# Patient Record
Sex: MALE | Race: OTHER | HISPANIC OR LATINO | Employment: FULL TIME | URBAN - METROPOLITAN AREA
[De-identification: names, ages, dates, MRNs, and addresses within clinical notes are randomized per-mention and may not be internally consistent; named-entity substitution may affect disease eponyms.]

---

## 2017-02-13 ENCOUNTER — ANESTHESIA EVENT (OUTPATIENT)
Dept: GASTROENTEROLOGY | Facility: HOSPITAL | Age: 57
End: 2017-02-13
Payer: COMMERCIAL

## 2017-02-13 ENCOUNTER — GENERIC CONVERSION - ENCOUNTER (OUTPATIENT)
Dept: OTHER | Facility: OTHER | Age: 57
End: 2017-02-13

## 2017-02-13 ENCOUNTER — HOSPITAL ENCOUNTER (OUTPATIENT)
Facility: HOSPITAL | Age: 57
Setting detail: OUTPATIENT SURGERY
Discharge: HOME | End: 2017-02-13
Attending: INTERNAL MEDICINE | Admitting: INTERNAL MEDICINE
Payer: COMMERCIAL

## 2017-02-13 ENCOUNTER — ANESTHESIA (OUTPATIENT)
Dept: GASTROENTEROLOGY | Facility: HOSPITAL | Age: 57
End: 2017-02-13
Payer: COMMERCIAL

## 2017-02-13 VITALS
TEMPERATURE: 97.1 F | HEIGHT: 66 IN | OXYGEN SATURATION: 96 % | SYSTOLIC BLOOD PRESSURE: 123 MMHG | DIASTOLIC BLOOD PRESSURE: 86 MMHG | WEIGHT: 193.5 LBS | RESPIRATION RATE: 16 BRPM | BODY MASS INDEX: 31.1 KG/M2 | HEART RATE: 75 BPM

## 2017-02-13 DIAGNOSIS — K63.5 POLYP OF COLON: ICD-10-CM

## 2017-02-13 PROCEDURE — 88342 IMHCHEM/IMCYTCHM 1ST ANTB: CPT | Performed by: INTERNAL MEDICINE

## 2017-02-13 PROCEDURE — 88305 TISSUE EXAM BY PATHOLOGIST: CPT | Performed by: INTERNAL MEDICINE

## 2017-02-13 RX ORDER — PANTOPRAZOLE SODIUM 40 MG/1
40 TABLET, DELAYED RELEASE ORAL DAILY
COMMUNITY
End: 2018-06-03

## 2017-02-13 RX ORDER — PROPOFOL 10 MG/ML
INJECTION, EMULSION INTRAVENOUS CONTINUOUS PRN
Status: DISCONTINUED | OUTPATIENT
Start: 2017-02-13 | End: 2017-02-13 | Stop reason: SURG

## 2017-02-13 RX ORDER — LIDOCAINE HYDROCHLORIDE 10 MG/ML
INJECTION, SOLUTION INFILTRATION; PERINEURAL AS NEEDED
Status: DISCONTINUED | OUTPATIENT
Start: 2017-02-13 | End: 2017-02-13 | Stop reason: SURG

## 2017-02-13 RX ORDER — SODIUM CHLORIDE, SODIUM LACTATE, POTASSIUM CHLORIDE, CALCIUM CHLORIDE 600; 310; 30; 20 MG/100ML; MG/100ML; MG/100ML; MG/100ML
125 INJECTION, SOLUTION INTRAVENOUS CONTINUOUS
Status: DISCONTINUED | OUTPATIENT
Start: 2017-02-13 | End: 2017-02-13 | Stop reason: HOSPADM

## 2017-02-13 RX ORDER — ROSUVASTATIN CALCIUM 10 MG/1
10 TABLET, COATED ORAL DAILY
COMMUNITY

## 2017-02-13 RX ORDER — PROPOFOL 10 MG/ML
INJECTION, EMULSION INTRAVENOUS AS NEEDED
Status: DISCONTINUED | OUTPATIENT
Start: 2017-02-13 | End: 2017-02-13 | Stop reason: SURG

## 2017-02-13 RX ORDER — DIPHENOXYLATE HYDROCHLORIDE AND ATROPINE SULFATE 2.5; .025 MG/1; MG/1
1 TABLET ORAL DAILY
COMMUNITY

## 2017-02-13 RX ORDER — SODIUM CHLORIDE, SODIUM LACTATE, POTASSIUM CHLORIDE, CALCIUM CHLORIDE 600; 310; 30; 20 MG/100ML; MG/100ML; MG/100ML; MG/100ML
INJECTION, SOLUTION INTRAVENOUS CONTINUOUS PRN
Status: DISCONTINUED | OUTPATIENT
Start: 2017-02-13 | End: 2017-02-13 | Stop reason: SURG

## 2017-02-13 RX ADMIN — PROPOFOL 50 MG: 10 INJECTION, EMULSION INTRAVENOUS at 09:33

## 2017-02-13 RX ADMIN — LIDOCAINE HYDROCHLORIDE 30 MG: 10 INJECTION, SOLUTION INFILTRATION; PERINEURAL at 09:30

## 2017-02-13 RX ADMIN — PROPOFOL 100 MCG/KG/MIN: 10 INJECTION, EMULSION INTRAVENOUS at 09:30

## 2017-02-13 RX ADMIN — PROPOFOL 50 MG: 10 INJECTION, EMULSION INTRAVENOUS at 09:36

## 2017-02-13 RX ADMIN — PROPOFOL 100 MG: 10 INJECTION, EMULSION INTRAVENOUS at 09:30

## 2017-02-13 RX ADMIN — SODIUM CHLORIDE, SODIUM LACTATE, POTASSIUM CHLORIDE, AND CALCIUM CHLORIDE: .6; .31; .03; .02 INJECTION, SOLUTION INTRAVENOUS at 09:02

## 2017-02-19 ENCOUNTER — GENERIC CONVERSION - ENCOUNTER (OUTPATIENT)
Dept: OTHER | Facility: OTHER | Age: 57
End: 2017-02-19

## 2017-05-25 ENCOUNTER — ALLSCRIPTS OFFICE VISIT (OUTPATIENT)
Dept: OTHER | Facility: OTHER | Age: 57
End: 2017-05-25

## 2017-05-26 ENCOUNTER — TRANSCRIBE ORDERS (OUTPATIENT)
Dept: LAB | Facility: CLINIC | Age: 57
End: 2017-05-26

## 2017-05-26 ENCOUNTER — APPOINTMENT (OUTPATIENT)
Dept: LAB | Facility: CLINIC | Age: 57
End: 2017-05-26
Payer: COMMERCIAL

## 2017-05-26 DIAGNOSIS — A04.8 OTHER SPECIFIED BACTERIAL INTESTINAL INFECTIONS: ICD-10-CM

## 2017-05-26 PROCEDURE — 87338 HPYLORI STOOL AG IA: CPT

## 2017-05-27 LAB — H PYLORI AG STL QL IA: NEGATIVE

## 2017-05-30 ENCOUNTER — GENERIC CONVERSION - ENCOUNTER (OUTPATIENT)
Dept: OTHER | Facility: OTHER | Age: 57
End: 2017-05-30

## 2017-08-10 ENCOUNTER — TRANSCRIBE ORDERS (OUTPATIENT)
Dept: LAB | Facility: CLINIC | Age: 57
End: 2017-08-10

## 2017-08-10 ENCOUNTER — APPOINTMENT (OUTPATIENT)
Dept: LAB | Facility: CLINIC | Age: 57
End: 2017-08-10
Payer: COMMERCIAL

## 2017-08-10 DIAGNOSIS — E78.2 MULTIPLE-TYPE HYPERLIPIDEMIA: ICD-10-CM

## 2017-08-10 DIAGNOSIS — R53.83 FATIGUE, UNSPECIFIED TYPE: ICD-10-CM

## 2017-08-10 DIAGNOSIS — Z12.5 SPECIAL SCREENING FOR MALIGNANT NEOPLASM OF PROSTATE: ICD-10-CM

## 2017-08-10 DIAGNOSIS — R35.0 URINARY FREQUENCY: ICD-10-CM

## 2017-08-10 DIAGNOSIS — E78.2 MULTIPLE-TYPE HYPERLIPIDEMIA: Primary | ICD-10-CM

## 2017-08-10 DIAGNOSIS — Z79.899 LONG-TERM USE OF HIGH-RISK MEDICATION: ICD-10-CM

## 2017-08-10 LAB
ALBUMIN SERPL BCP-MCNC: 3.7 G/DL (ref 3.5–5)
ALP SERPL-CCNC: 87 U/L (ref 46–116)
ALT SERPL W P-5'-P-CCNC: 42 U/L (ref 12–78)
ANION GAP SERPL CALCULATED.3IONS-SCNC: 6 MMOL/L (ref 4–13)
AST SERPL W P-5'-P-CCNC: 26 U/L (ref 5–45)
BACTERIA UR QL AUTO: ABNORMAL /HPF
BASOPHILS # BLD AUTO: 0.06 THOUSANDS/ΜL (ref 0–0.1)
BASOPHILS NFR BLD AUTO: 1 % (ref 0–1)
BILIRUB SERPL-MCNC: 0.5 MG/DL (ref 0.2–1)
BILIRUB UR QL STRIP: NEGATIVE
BUN SERPL-MCNC: 8 MG/DL (ref 5–25)
CALCIUM SERPL-MCNC: 9 MG/DL (ref 8.3–10.1)
CHLORIDE SERPL-SCNC: 104 MMOL/L (ref 100–108)
CHOLEST SERPL-MCNC: 200 MG/DL (ref 50–200)
CLARITY UR: CLEAR
CO2 SERPL-SCNC: 30 MMOL/L (ref 21–32)
COLOR UR: YELLOW
CREAT SERPL-MCNC: 0.84 MG/DL (ref 0.6–1.3)
EOSINOPHIL # BLD AUTO: 0.07 THOUSAND/ΜL (ref 0–0.61)
EOSINOPHIL NFR BLD AUTO: 1 % (ref 0–6)
ERYTHROCYTE [DISTWIDTH] IN BLOOD BY AUTOMATED COUNT: 15.6 % (ref 11.6–15.1)
GFR SERPL CREATININE-BSD FRML MDRD: 98 ML/MIN/1.73SQ M
GLUCOSE P FAST SERPL-MCNC: 98 MG/DL (ref 65–99)
GLUCOSE UR STRIP-MCNC: NEGATIVE MG/DL
HCT VFR BLD AUTO: 48.4 % (ref 36.5–49.3)
HDLC SERPL-MCNC: 25 MG/DL (ref 40–60)
HGB BLD-MCNC: 16 G/DL (ref 12–17)
HGB UR QL STRIP.AUTO: NEGATIVE
KETONES UR STRIP-MCNC: NEGATIVE MG/DL
LDLC SERPL CALC-MCNC: 100 MG/DL (ref 0–100)
LEUKOCYTE ESTERASE UR QL STRIP: ABNORMAL
LYMPHOCYTES # BLD AUTO: 2.32 THOUSANDS/ΜL (ref 0.6–4.47)
LYMPHOCYTES NFR BLD AUTO: 43 % (ref 14–44)
MCH RBC QN AUTO: 27.1 PG (ref 26.8–34.3)
MCHC RBC AUTO-ENTMCNC: 33.1 G/DL (ref 31.4–37.4)
MCV RBC AUTO: 82 FL (ref 82–98)
MONOCYTES # BLD AUTO: 0.42 THOUSAND/ΜL (ref 0.17–1.22)
MONOCYTES NFR BLD AUTO: 8 % (ref 4–12)
NEUTROPHILS # BLD AUTO: 2.57 THOUSANDS/ΜL (ref 1.85–7.62)
NEUTS SEG NFR BLD AUTO: 47 % (ref 43–75)
NITRITE UR QL STRIP: NEGATIVE
NON-SQ EPI CELLS URNS QL MICRO: ABNORMAL /HPF
PH UR STRIP.AUTO: 7 [PH] (ref 4.5–8)
PLATELET # BLD AUTO: 192 THOUSANDS/UL (ref 149–390)
PMV BLD AUTO: 11.9 FL (ref 8.9–12.7)
POTASSIUM SERPL-SCNC: 3.9 MMOL/L (ref 3.5–5.3)
PROT SERPL-MCNC: 7.2 G/DL (ref 6.4–8.2)
PROT UR STRIP-MCNC: NEGATIVE MG/DL
PSA SERPL-MCNC: 0.8 NG/ML (ref 0–4)
RBC # BLD AUTO: 5.9 MILLION/UL (ref 3.88–5.62)
RBC #/AREA URNS AUTO: ABNORMAL /HPF
SODIUM SERPL-SCNC: 140 MMOL/L (ref 136–145)
SP GR UR STRIP.AUTO: 1.02 (ref 1–1.03)
T3 SERPL-MCNC: 1.1 NG/ML (ref 0.6–1.8)
T4 FREE SERPL-MCNC: 0.91 NG/DL (ref 0.76–1.46)
TRIGL SERPL-MCNC: 373 MG/DL
TSH SERPL DL<=0.05 MIU/L-ACNC: 1.64 UIU/ML (ref 0.36–3.74)
UROBILINOGEN UR QL STRIP.AUTO: 0.2 E.U./DL
WBC # BLD AUTO: 5.44 THOUSAND/UL (ref 4.31–10.16)
WBC #/AREA URNS AUTO: ABNORMAL /HPF

## 2017-08-10 PROCEDURE — 84439 ASSAY OF FREE THYROXINE: CPT

## 2017-08-10 PROCEDURE — G0103 PSA SCREENING: HCPCS

## 2017-08-10 PROCEDURE — 85025 COMPLETE CBC W/AUTO DIFF WBC: CPT

## 2017-08-10 PROCEDURE — 80053 COMPREHEN METABOLIC PANEL: CPT

## 2017-08-10 PROCEDURE — 84443 ASSAY THYROID STIM HORMONE: CPT

## 2017-08-10 PROCEDURE — 80061 LIPID PANEL: CPT

## 2017-08-10 PROCEDURE — 81001 URINALYSIS AUTO W/SCOPE: CPT | Performed by: PODIATRIST

## 2017-08-10 PROCEDURE — 84480 ASSAY TRIIODOTHYRONINE (T3): CPT

## 2017-08-10 PROCEDURE — 36415 COLL VENOUS BLD VENIPUNCTURE: CPT

## 2017-10-06 ENCOUNTER — APPOINTMENT (OUTPATIENT)
Dept: LAB | Facility: MEDICAL CENTER | Age: 57
End: 2017-10-06

## 2017-10-06 ENCOUNTER — TRANSCRIBE ORDERS (OUTPATIENT)
Dept: URGENT CARE | Facility: MEDICAL CENTER | Age: 57
End: 2017-10-06

## 2017-10-06 DIAGNOSIS — Z00.00 PHYSICAL EXAM: Primary | ICD-10-CM

## 2017-10-06 LAB — RUBV IGG SERPL IA-ACNC: 160.8 IU/ML

## 2017-10-06 PROCEDURE — 86765 RUBEOLA ANTIBODY: CPT

## 2017-10-06 PROCEDURE — 86787 VARICELLA-ZOSTER ANTIBODY: CPT

## 2017-10-06 PROCEDURE — 36415 COLL VENOUS BLD VENIPUNCTURE: CPT

## 2017-10-06 PROCEDURE — 86762 RUBELLA ANTIBODY: CPT

## 2017-10-06 PROCEDURE — 86480 TB TEST CELL IMMUN MEASURE: CPT

## 2017-10-06 PROCEDURE — 86735 MUMPS ANTIBODY: CPT

## 2017-10-08 LAB — QUANTIFERON-TB GOLD IN TUBE: NORMAL

## 2017-10-10 LAB
MEV IGG SER QL: NORMAL
MUV IGG SER QL: NORMAL
VZV IGG SER IA-ACNC: NORMAL

## 2018-01-14 NOTE — RESULT NOTES
Discussion/Summary   Please inform patient that the stool H  pylori antigen test is negative, meaning that his antibiotic treatment was successful in eradicating the infection  He may continue with Protonix if he remains with symptoms, but he does not need any further antibiotic treatment       Verified Results  (1) Alberto Phillips, STOOL [id number] 11:15AM Richard Cat Order Number: [id number]     Test Name Result Flag Reference   H PYLORI ANTIGEN Negative  Negative   Performed at:  [address]  [id number]  : Maira Rubio MD, Phone:  [phone]

## 2018-01-14 NOTE — MISCELLANEOUS
Message  GI Reminder Recall [facility]:   Date: 02/24/2016   Dear Jose F Santos:     Review of our records shows you are due for the following: Colonoscopy  Please call the following office to schedule your appointment:   [address], [address], [place], [address] [place] [address]  [phone]  We look forward to hearing from you! Sincerely,         Signatures   Electronically signed by :  Jesse Zepeda, ; Feb 24 2016  4:07PM EST                       (Author)

## 2018-01-15 VITALS
HEART RATE: 86 BPM | SYSTOLIC BLOOD PRESSURE: 112 MMHG | TEMPERATURE: 97.2 F | BODY MASS INDEX: 31.63 KG/M2 | OXYGEN SATURATION: 98 % | DIASTOLIC BLOOD PRESSURE: 78 MMHG | WEIGHT: 196 LBS

## 2018-01-18 NOTE — RESULT NOTES
Message    [place] to call patient      Gastric biopsies are positive for H  pylori  Patient was treated with triple therapy 3 years ago  Treatment with quadruple therapy now  Follow-up office visit and check H  pylori antigen in the stool     ADDENDUM: I spoke with patient about results; I sent Rx for quadruple therapy (bismuth, protonix, doxycycline and flagyl) and tasked office staff to set up OV in 2 months  Verified Results  (1) TISSUE EXAM [id number] 09:34AM Hector Robbie     Test Name Result Flag Reference   LAB AP CASE REPORT (Report)     Surgical Pathology Report             Case: [id number]                   Authorizing Provider: Cristopher Nevarez MD     Collected:      02/13/2017 0934        Ordering Location:   [facility]    Received[facility]      [facility]/2017 [address] [facility]                               Pathologist:      Nuria Pollock MD                                Specimen:  Stomach, Bx Gastric body, r/o H pylori   LAB AP FINAL DIAGNOSIS (Report)     A  Stomach, Bx Gastric body, r/o H pylori:  Chronic, moderately active gastritis   -No evidence of Paneth cell metaplasia   -No evidence of dysplasia or malignancy  -Many H Pylori organisms identified on immunohistochemical stained slide  Control reacted appropriatel    Electronically signed by Nuria Pollock MD on 2/14/2017 at 1:52 PM   LAB AP SURGICAL ADDITIONAL INFORMATION (Report)     These tests were developed and their performance characteristics   determined by [facility] or [employer]  They may not be cleared or approved by the [place]  Food and   Drug Administration  The FDA has determined that such clearance or   approval is not necessary  These tests are used for clinical purposes  They should not be regarded as investigational or for research   This   laboratory has been approved by CLIA 88, designated as a high-complexity   laboratory and is qualified to perform these tests    LAB AP GROSS DESCRIPTION (Report)     A  The specimen is received in formalin, labeled with the patient's name   and hospital number, and is designated gastric body biopsy  The   specimen consists of 2 tan-pink soft tissue fragments measuring 0 3 cm and   0 6 cm in greatest dimension  Entirely submitted  One cassette  Note: The estimated total formalin fixation time based upon information   provided by the submitting clinician and the standard processing schedule   is 19 0 hours    MAS   LAB AP CLINICAL INFORMATION R/o h pylori     R/o h pylori

## 2018-01-29 ENCOUNTER — LAB (OUTPATIENT)
Dept: LAB | Facility: MEDICAL CENTER | Age: 58
End: 2018-01-29
Payer: COMMERCIAL

## 2018-01-29 ENCOUNTER — TRANSCRIBE ORDERS (OUTPATIENT)
Dept: ADMINISTRATIVE | Facility: HOSPITAL | Age: 58
End: 2018-01-29

## 2018-01-29 DIAGNOSIS — Z12.5 ENCOUNTER FOR SCREENING FOR MALIGNANT NEOPLASM OF PROSTATE: ICD-10-CM

## 2018-01-29 DIAGNOSIS — E78.49 FAMILIAL COMBINED HYPERLIPIDEMIA: Primary | ICD-10-CM

## 2018-01-29 DIAGNOSIS — R53.83 FATIGUE, UNSPECIFIED TYPE: ICD-10-CM

## 2018-01-29 LAB
ALBUMIN SERPL BCP-MCNC: 4 G/DL (ref 3.5–5)
ALP SERPL-CCNC: 107 U/L (ref 46–116)
ALT SERPL W P-5'-P-CCNC: 31 U/L (ref 12–78)
ANION GAP SERPL CALCULATED.3IONS-SCNC: 4 MMOL/L (ref 4–13)
AST SERPL W P-5'-P-CCNC: 18 U/L (ref 5–45)
BILIRUB SERPL-MCNC: 0.7 MG/DL (ref 0.2–1)
BUN SERPL-MCNC: 11 MG/DL (ref 5–25)
CALCIUM SERPL-MCNC: 9 MG/DL (ref 8.3–10.1)
CHLORIDE SERPL-SCNC: 104 MMOL/L (ref 100–108)
CHOLEST SERPL-MCNC: 185 MG/DL (ref 50–200)
CO2 SERPL-SCNC: 31 MMOL/L (ref 21–32)
CREAT SERPL-MCNC: 0.93 MG/DL (ref 0.6–1.3)
GFR SERPL CREATININE-BSD FRML MDRD: 91 ML/MIN/1.73SQ M
GLUCOSE P FAST SERPL-MCNC: 90 MG/DL (ref 65–99)
HDLC SERPL-MCNC: 34 MG/DL (ref 40–60)
LDLC SERPL CALC-MCNC: 109 MG/DL (ref 0–100)
POTASSIUM SERPL-SCNC: 4.1 MMOL/L (ref 3.5–5.3)
PROT SERPL-MCNC: 7.2 G/DL (ref 6.4–8.2)
PSA SERPL-MCNC: 0.7 NG/ML (ref 0–4)
SODIUM SERPL-SCNC: 139 MMOL/L (ref 136–145)
T3 SERPL-MCNC: 1.2 NG/ML (ref 0.6–1.8)
T4 FREE SERPL-MCNC: 0.8 NG/DL (ref 0.76–1.46)
TRIGL SERPL-MCNC: 211 MG/DL
TSH SERPL DL<=0.05 MIU/L-ACNC: 1.68 UIU/ML (ref 0.36–3.74)

## 2018-01-29 PROCEDURE — 80061 LIPID PANEL: CPT | Performed by: INTERNAL MEDICINE

## 2018-01-29 PROCEDURE — 84443 ASSAY THYROID STIM HORMONE: CPT | Performed by: INTERNAL MEDICINE

## 2018-01-29 PROCEDURE — 84480 ASSAY TRIIODOTHYRONINE (T3): CPT | Performed by: INTERNAL MEDICINE

## 2018-01-29 PROCEDURE — 36415 COLL VENOUS BLD VENIPUNCTURE: CPT | Performed by: INTERNAL MEDICINE

## 2018-01-29 PROCEDURE — G0103 PSA SCREENING: HCPCS

## 2018-01-29 PROCEDURE — 80053 COMPREHEN METABOLIC PANEL: CPT | Performed by: INTERNAL MEDICINE

## 2018-01-29 PROCEDURE — 84439 ASSAY OF FREE THYROXINE: CPT

## 2018-01-30 ENCOUNTER — OFFICE VISIT (OUTPATIENT)
Dept: UROLOGY | Facility: AMBULATORY SURGERY CENTER | Age: 58
End: 2018-01-30
Payer: COMMERCIAL

## 2018-01-30 VITALS
HEIGHT: 67 IN | WEIGHT: 196.2 LBS | SYSTOLIC BLOOD PRESSURE: 122 MMHG | HEART RATE: 80 BPM | DIASTOLIC BLOOD PRESSURE: 86 MMHG | BODY MASS INDEX: 30.79 KG/M2

## 2018-01-30 DIAGNOSIS — R35.1 NOCTURIA: ICD-10-CM

## 2018-01-30 DIAGNOSIS — N40.1 BPH WITH OBSTRUCTION/LOWER URINARY TRACT SYMPTOMS: ICD-10-CM

## 2018-01-30 DIAGNOSIS — R35.0 URINARY FREQUENCY: ICD-10-CM

## 2018-01-30 DIAGNOSIS — N13.8 BPH WITH OBSTRUCTION/LOWER URINARY TRACT SYMPTOMS: ICD-10-CM

## 2018-01-30 DIAGNOSIS — R35.0 FREQUENCY OF URINATION: Primary | ICD-10-CM

## 2018-01-30 LAB
BACTERIA UR QL AUTO: NORMAL /HPF
BILIRUB UR QL STRIP: NEGATIVE
CLARITY UR: CLEAR
COLOR UR: YELLOW
GLUCOSE UR STRIP-MCNC: NEGATIVE MG/DL
HGB UR QL STRIP.AUTO: NEGATIVE
KETONES UR STRIP-MCNC: NEGATIVE MG/DL
LEUKOCYTE ESTERASE UR QL STRIP: NEGATIVE
NITRITE UR QL STRIP: NEGATIVE
NON-SQ EPI CELLS URNS QL MICRO: NORMAL /HPF
PH UR STRIP.AUTO: 6 [PH] (ref 4.5–8)
PROT UR STRIP-MCNC: NEGATIVE MG/DL
RBC #/AREA URNS AUTO: NORMAL /HPF
SL AMB  POCT GLUCOSE, UA: NORMAL
SL AMB LEUKOCYTE ESTERASE,UA: NORMAL
SL AMB POCT BILIRUBIN,UA: NORMAL
SL AMB POCT BLOOD,UA: NORMAL
SL AMB POCT CLARITY,UA: CLEAR
SL AMB POCT COLOR,UA: YELLOW
SL AMB POCT KETONES,UA: NORMAL
SL AMB POCT NITRITE,UA: NORMAL
SL AMB POCT PH,UA: 6
SL AMB POCT SPECIFIC GRAVITY,UA: 1.02
SP GR UR STRIP.AUTO: 1.02 (ref 1–1.03)
UROBILINOGEN UR QL STRIP.AUTO: 0.2 E.U./DL
WBC #/AREA URNS AUTO: NORMAL /HPF

## 2018-01-30 PROCEDURE — 87086 URINE CULTURE/COLONY COUNT: CPT | Performed by: PHYSICIAN ASSISTANT

## 2018-01-30 PROCEDURE — 99213 OFFICE O/P EST LOW 20 MIN: CPT | Performed by: PHYSICIAN ASSISTANT

## 2018-01-30 PROCEDURE — 81002 URINALYSIS NONAUTO W/O SCOPE: CPT | Performed by: PHYSICIAN ASSISTANT

## 2018-01-30 PROCEDURE — 81001 URINALYSIS AUTO W/SCOPE: CPT | Performed by: PHYSICIAN ASSISTANT

## 2018-01-30 RX ORDER — ALFUZOSIN HYDROCHLORIDE 10 MG/1
10 TABLET, EXTENDED RELEASE ORAL DAILY
Qty: 30 TABLET | Refills: 11 | Status: SHIPPED | OUTPATIENT
Start: 2018-01-30 | End: 2018-01-31 | Stop reason: SDUPTHER

## 2018-01-30 RX ORDER — TAMSULOSIN HYDROCHLORIDE 0.4 MG/1
1 CAPSULE ORAL
COMMUNITY
Start: 2016-01-11 | End: 2018-01-30 | Stop reason: SINTOL

## 2018-01-30 RX ORDER — ICOSAPENT ETHYL 1000 MG/1
CAPSULE ORAL
COMMUNITY
End: 2018-06-03

## 2018-01-30 NOTE — PROGRESS NOTES
1/30/2018      Chief Complaint   Patient presents with    Benign Prostatic Hypertrophy    Urinary Frequency       History of Present Illness  Omayra Avila is a 62 y o  male here for follow up evaluation of nocturia and urinary frequency  In the past the patient was on Flomax but states that this made him feel funny so he discontinued it  Presents today with complaints of frequency, urgency, nocturia 2-3 times per night and a fair stream   He does not feel as though he was empties to completion  Also has right lower quadrant pain that resolved with urination and bowel movements  Review of Systems   Constitutional: Negative for activity change, appetite change, chills and fever  Gastrointestinal: Negative for abdominal distention and abdominal pain  Genitourinary: Negative for difficulty urinating, dysuria, flank pain, hematuria and urgency  Psychiatric/Behavioral: Negative for behavioral problems and confusion  Urinary Incontinence Screening    Flowsheet Row Most Recent Value   Urinary Incontinence   Urinary Incontinence? No   Incomplete emptying? No   Urinary frequency? Yes   Urinary urgency? Yes   Urinary hesitancy? No   Dysuria (painful difficult urination)? No   Nocturia (waking up to use the bathroom)? Yes [2-3x]   Straining (having to push to go)? No   Weak stream?  No   Intermittent stream?  Yes   Post void dribbling? No          Past Medical History  Past Medical History:   Diagnosis Date    BPH (benign prostatic hyperplasia)     GERD (gastroesophageal reflux disease)     Hyperlipidemia        Past Social History  Past Surgical History:   Procedure Laterality Date    COLONOSCOPY      ESOPHAGOGASTRODUODENOSCOPY      CA COLONOSCOPY FLX DX W/COLLJ SPEC WHEN PFRMD N/A 2/13/2017    Procedure: COLONOSCOPY;  Surgeon: Shireen Tavarez MD;  [facility];   Service: Gastroenterology     History   Smoking Status    Former Smoker    Quit date: 7/1/2017   Smokeless Tobacco    Never Used       Past Family History  Family History   Problem Relation Age of Onset    Breast cancer Mother     Diabetes Mother     Hypertension Mother     Lung cancer Mother        Past Social history  Social History     Social History    Marital status: /Civil Union     Spouse name: N/A    Number of children: N/A    Years of education: N/A     Occupational History    Not on file  Social History Main Topics    Smoking status: Former Smoker     Quit date: 7/1/2017    Smokeless tobacco: Never Used    Alcohol use Yes      Comment: social    Drug use: No    Sexual activity: Not on file     Other Topics Concern    Not on file     Social History Narrative    No narrative on file       Current Medications  Current Outpatient Prescriptions   Medication Sig Dispense Refill    Icosapent Ethyl (VASCEPA) 1 g CAPS Take by mouth      multivitamin (THERAGRAN) TABS Take 1 tablet by mouth daily      Omega-3 Fatty Acids (FISH OIL CONCENTRATE PO) Take by mouth      pantoprazole (PROTONIX) 40 mg tablet Take 40 mg by mouth daily      Saw Palmetto, Serenoa repens, 450 MG CAPS Take by mouth      alfuzosin (UROXATRAL) 10 mg 24 hr tablet Take 1 tablet (10 mg total) by mouth daily 30 tablet 11    rosuvastatin (CRESTOR) 10 MG tablet Take 10 mg by mouth daily       No current facility-administered medications for this visit  Allergies  No Known Allergies      The following portions of the patient's history were reviewed and updated as appropriate: allergies, current medications, past medical history, past social history, past surgical history and problem list       Vitals  Vitals:    01/30/18 1411   BP: 122/86   Pulse: 80   Weight: 89 kg (196 lb 3 2 oz)   Height: 5' 6 5" (1 689 m)         Physical Exam    Constitutional   General appearance: Patient is seated and in no acute distress, well appearing and well nourished     Head and Face   Head and face: Normal     Eyes   Conjunctiva and lids: No erythema, swelling or discharge  Ears, Nose, Mouth, and Throat   Hearing: Normal     Pulmonary   Respiratory effort: No increased work of breathing or signs of respiratory distress  Cardiovascular   Examination of extremities for edema and/or varicosities: Normal     Abdomen   Abdomen: Non-tender, no masses  Genitourinary   Digital rectal exam of prostate:  Smooth, nontender, 25 gram prostate with no nodules  Musculoskeletal   Gait and station: Normal  Skin   Skin and subcutaneous tissue: Warm, dry, and intact  No visible lesions or rashes  Psychiatric   Judgment and insight: Normal  Recent and remote memory:  Normal  Mood and affect: Normal        Results  Lab Results   Component Value Date    PSA 0 7 01/29/2018    PSA 0 8 08/10/2017    PSA 0 7 04/21/2016     Lab Results   Component Value Date    GLUCOSE 93 10/07/2016    CALCIUM 9 0 01/29/2018     01/29/2018    K 4 1 01/29/2018    CO2 31 01/29/2018     01/29/2018    BUN 11 01/29/2018    CREATININE 0 93 01/29/2018     Lab Results   Component Value Date    WBC 5 44 08/10/2017    HGB 16 0 08/10/2017    HCT 48 4 08/10/2017    MCV 82 08/10/2017     08/10/2017           Orders  Orders Placed This Encounter   Procedures    US kidney and bladder with pvr     Standing Status:   Future     Standing Expiration Date:   1/30/2022     Scheduling Instructions:      13 years and Up - 1)  Full bladder required  2)  Please drink 24-32 oz of water 1 hour prior to appointment time  3)  No voiding 1 hour prior to appointment time  "Prep required if being scheduled in conjunction with other studies, refer to those examination's Preps first before scheduling  Patient must drink 24 oz of water 60 minutes before your scheduled appointment time  This test requires you to have a FULL bladder  Please do not urinate 1 hour before your appointment time  Patient is not to urinate until their Ultrasound is completed   Bladder filling is a key part of this study and needs to be full for accurate imaging during this exam             Please bring your physician order, insurance cards, a form of photo ID and a list of your medications with you  Arrive 15 minutes prior to your appointment time in order to register  If you need to have lab work or a urinalysis, please do this AFTER your ultrasound  "           Order Specific Question:   Reason for Exam:     Answer:   BPH    POCT urine dip           Assessment and Plan    62 y o  male managed by Dr Tinoco Sensing    1  Nocturia  2  Urinary frequency    Will start the patient on alfusozin  Side effect profile reviewed  We will see him back in 6-8 weeks to see how he was doing on this medication  We will also have him obtain an ultrasound of the kidneys and bladder with PVR to ensure he is emptying has no abnormalities to account for his right lower quadrant pain  The patient understands agrees to this treatment plan  All questions and concerns have been addressed and answered      Urine revealing blood on dip, will send for micro and culture and call patient if he required antibiotics or a microscopic hematuria workup

## 2018-01-30 NOTE — LETTER
January 30, 2018     [address] [facility]    Patient: Rojelio Heredia   YOB: 1960   Date of Visit: 1/30/2018       Dear Dr Kinza Cole: Thank you for referring Rojelio Heredia to me for evaluation  Below are my notes for this consultation  If you have questions, please do not hesitate to call me  I look forward to following your patient along with you  Sincerely,        Eileen Elizalde PA-C        CC: MD Eileen Prasad PA-C  1/30/2018  2:41 PM  Sign at close encounter  1/30/2018      Chief Complaint   Patient presents with    Benign Prostatic Hypertrophy    Urinary Frequency       History of Present Illness  Rojelio Heredia is a 62 y o  male here for follow up evaluation of nocturia and urinary frequency  In the past the patient was on Flomax but states that this made him feel funny so he discontinued it  Presents today with complaints of frequency, urgency, nocturia 2-3 times per night and a fair stream   He does not feel as though he was empties to completion  Also has right lower quadrant pain that resolved with urination and bowel movements  Review of Systems   Constitutional: Negative for activity change, appetite change, chills and fever  Gastrointestinal: Negative for abdominal distention and abdominal pain  Genitourinary: Negative for difficulty urinating, dysuria, flank pain, hematuria and urgency  Psychiatric/Behavioral: Negative for behavioral problems and confusion  Urinary Incontinence Screening    Flowsheet Row Most Recent Value   Urinary Incontinence   Urinary Incontinence? No   Incomplete emptying? No   Urinary frequency? Yes   Urinary urgency? Yes   Urinary hesitancy? No   Dysuria (painful difficult urination)? No   Nocturia (waking up to use the bathroom)? Yes [2-3x]   Straining (having to push to go)? No   Weak stream?  No   Intermittent stream?  Yes   Post void dribbling?   No Past Medical History  Past Medical History:   Diagnosis Date    BPH (benign prostatic hyperplasia)     GERD (gastroesophageal reflux disease)     Hyperlipidemia        Past Social History  Past Surgical History:   Procedure Laterality Date    COLONOSCOPY      ESOPHAGOGASTRODUODENOSCOPY      ID COLONOSCOPY FLX DX W/COLLJ SPEC WHEN PFRMD N/A 2/13/2017    Procedure: COLONOSCOPY;  Surgeon: Ko Mukherjee MD;  [facility]; Service: Gastroenterology     History   Smoking Status    Former Smoker    Quit date: 7/1/2017   Smokeless Tobacco    Never Used       Past Family History  Family History   Problem Relation Age of Onset    Breast cancer Mother     Diabetes Mother     Hypertension Mother     Lung cancer Mother        Past Social history  Social History     Social History    Marital status: /Civil Union     Spouse name: N/A    Number of children: N/A    Years of education: N/A     Occupational History    Not on file  Social History Main Topics    Smoking status: Former Smoker     Quit date: 7/1/2017    Smokeless tobacco: Never Used    Alcohol use Yes      Comment: social    Drug use: No    Sexual activity: Not on file     Other Topics Concern    Not on file     Social History Narrative    No narrative on file       Current Medications  Current Outpatient Prescriptions   Medication Sig Dispense Refill    Icosapent Ethyl (VASCEPA) 1 g CAPS Take by mouth      multivitamin (THERAGRAN) TABS Take 1 tablet by mouth daily      Omega-3 Fatty Acids (FISH OIL CONCENTRATE PO) Take by mouth      pantoprazole (PROTONIX) 40 mg tablet Take 40 mg by mouth daily      Saw Palmetto, Serenoa repens, 450 MG CAPS Take by mouth      alfuzosin (UROXATRAL) 10 mg 24 hr tablet Take 1 tablet (10 mg total) by mouth daily 30 tablet 11    rosuvastatin (CRESTOR) 10 MG tablet Take 10 mg by mouth daily       No current facility-administered medications for this visit          Allergies  No Known Allergies      The following portions of the patient's history were reviewed and updated as appropriate: allergies, current medications, past medical history, past social history, past surgical history and problem list       Vitals  Vitals:    01/30/18 1411   BP: 122/86   Pulse: 80   Weight: 89 kg (196 lb 3 2 oz)   Height: 5' 6 5" (1 689 m)         Physical Exam    Constitutional   General appearance: Patient is seated and in no acute distress, well appearing and well nourished  Head and Face   Head and face: Normal     Eyes   Conjunctiva and lids: No erythema, swelling or discharge  Ears, Nose, Mouth, and Throat   Hearing: Normal     Pulmonary   Respiratory effort: No increased work of breathing or signs of respiratory distress  Cardiovascular   Examination of extremities for edema and/or varicosities: Normal     Abdomen   Abdomen: Non-tender, no masses  Genitourinary   Digital rectal exam of prostate:  Smooth, nontender, 25 gram prostate with no nodules  Musculoskeletal   Gait and station: Normal  Skin   Skin and subcutaneous tissue: Warm, dry, and intact  No visible lesions or rashes    Psychiatric   Judgment and insight: Normal  Recent and remote memory:  Normal  Mood and affect: Normal        Results  Lab Results   Component Value Date    PSA 0 7 01/29/2018    PSA 0 8 08/10/2017    PSA 0 7 04/21/2016     Lab Results   Component Value Date    GLUCOSE 93 10/07/2016    CALCIUM 9 0 01/29/2018     01/29/2018    K 4 1 01/29/2018    CO2 31 01/29/2018     01/29/2018    BUN 11 01/29/2018    CREATININE 0 93 01/29/2018     Lab Results   Component Value Date    WBC 5 44 08/10/2017    HGB 16 0 08/10/2017    HCT 48 4 08/10/2017    MCV 82 08/10/2017     08/10/2017           Orders  Orders Placed This Encounter   Procedures    US kidney and bladder with pvr     Standing Status:   Future     Standing Expiration Date:   1/30/2022     Scheduling Instructions:      13 years and Up - 1)  Full bladder required  2)  Please drink 24-32 oz of water 1 hour prior to appointment time  3)  No voiding 1 hour prior to appointment time  "Prep required if being scheduled in conjunction with other studies, refer to those examination's Preps first before scheduling  Patient must drink 24 oz of water 60 minutes before your scheduled appointment time  This test requires you to have a FULL bladder  Please do not urinate 1 hour before your appointment time  Patient is not to urinate until their Ultrasound is completed  Bladder filling is a key part of this study and needs to be full for accurate imaging during this exam             Please bring your physician order, insurance cards, a form of photo ID and a list of your medications with you  Arrive 15 minutes prior to your appointment time in order to register  If you need to have lab work or a urinalysis, please do this AFTER your ultrasound  "           Order Specific Question:   Reason for Exam:     Answer:   BPH    POCT urine dip           Assessment and Plan    62 y o  male managed by Dr Michelle Fisher    1  Nocturia  2  Urinary frequency    Will start the patient on alfusozin  Side effect profile reviewed  We will see him back in 6-8 weeks to see how he was doing on this medication  We will also have him obtain an ultrasound of the kidneys and bladder with PVR to ensure he is emptying has no abnormalities to account for his right lower quadrant pain  The patient understands agrees to this treatment plan  All questions and concerns have been addressed and answered

## 2018-01-31 DIAGNOSIS — N40.1 BPH WITH OBSTRUCTION/LOWER URINARY TRACT SYMPTOMS: ICD-10-CM

## 2018-01-31 DIAGNOSIS — N13.8 BPH WITH OBSTRUCTION/LOWER URINARY TRACT SYMPTOMS: ICD-10-CM

## 2018-01-31 DIAGNOSIS — R35.0 FREQUENCY OF URINATION: ICD-10-CM

## 2018-01-31 DIAGNOSIS — R35.1 NOCTURIA: ICD-10-CM

## 2018-01-31 DIAGNOSIS — R35.0 URINARY FREQUENCY: ICD-10-CM

## 2018-01-31 LAB — BACTERIA UR CULT: NORMAL

## 2018-01-31 RX ORDER — ALFUZOSIN HYDROCHLORIDE 10 MG/1
10 TABLET, EXTENDED RELEASE ORAL DAILY
Qty: 30 TABLET | Refills: 0 | Status: SHIPPED | OUTPATIENT
Start: 2018-01-31 | End: 2018-02-28 | Stop reason: SDUPTHER

## 2018-02-01 ENCOUNTER — TELEPHONE (OUTPATIENT)
Dept: UROLOGY | Facility: AMBULATORY SURGERY CENTER | Age: 58
End: 2018-02-01

## 2018-02-01 NOTE — TELEPHONE ENCOUNTER
Spoke with patient who states the problem with the prescription was resolved  No further action needed

## 2018-02-28 DIAGNOSIS — R35.0 FREQUENCY OF URINATION: ICD-10-CM

## 2018-02-28 DIAGNOSIS — N13.8 BPH WITH OBSTRUCTION/LOWER URINARY TRACT SYMPTOMS: ICD-10-CM

## 2018-02-28 DIAGNOSIS — N40.1 BPH WITH OBSTRUCTION/LOWER URINARY TRACT SYMPTOMS: ICD-10-CM

## 2018-02-28 DIAGNOSIS — R35.1 NOCTURIA: ICD-10-CM

## 2018-02-28 DIAGNOSIS — R35.0 URINARY FREQUENCY: ICD-10-CM

## 2018-02-28 RX ORDER — ALFUZOSIN HYDROCHLORIDE 10 MG/1
10 TABLET, EXTENDED RELEASE ORAL DAILY
Qty: 30 TABLET | Refills: 0 | Status: SHIPPED | OUTPATIENT
Start: 2018-02-28 | End: 2018-04-02 | Stop reason: SDUPTHER

## 2018-03-09 ENCOUNTER — OFFICE VISIT (OUTPATIENT)
Dept: URGENT CARE | Age: 58
End: 2018-03-09
Payer: COMMERCIAL

## 2018-03-09 VITALS
OXYGEN SATURATION: 97 % | HEIGHT: 66 IN | BODY MASS INDEX: 31.34 KG/M2 | HEART RATE: 86 BPM | DIASTOLIC BLOOD PRESSURE: 90 MMHG | SYSTOLIC BLOOD PRESSURE: 139 MMHG | RESPIRATION RATE: 18 BRPM | WEIGHT: 195 LBS | TEMPERATURE: 97.9 F

## 2018-03-09 DIAGNOSIS — S46.911A STRAIN OF RIGHT SHOULDER, INITIAL ENCOUNTER: Primary | ICD-10-CM

## 2018-03-09 PROCEDURE — G0382 LEV 3 HOSP TYPE B ED VISIT: HCPCS | Performed by: FAMILY MEDICINE

## 2018-03-09 PROCEDURE — S9083 URGENT CARE CENTER GLOBAL: HCPCS | Performed by: FAMILY MEDICINE

## 2018-03-09 RX ORDER — IBUPROFEN 600 MG/1
600 TABLET ORAL EVERY 8 HOURS PRN
Qty: 30 TABLET | Refills: 0 | Status: SHIPPED | OUTPATIENT
Start: 2018-03-09 | End: 2018-06-03

## 2018-03-09 NOTE — LETTER
March 9, 2018     Patient: Erna Villalpando   YOB: 1960   Date of Visit: 3/9/2018       To Whom It May Concern: It is my medical opinion that Erna Villalpando may return to work on 03/12/2018  If you have any questions or concerns, please don't hesitate to call           Sincerely,            Ivonne Councilman, PA-C    CC: No Recipients

## 2018-03-09 NOTE — PROGRESS NOTES
[facility]        NAME: Shantanu Vázquez is a 62 y o  male  : 1960    MRN: [id number]  DATE: 2018  TIME: 8:06 AM    Assessment and Plan   Strain of right shoulder, initial encounter [S15 166S]  1  Strain of right shoulder, initial encounter  ibuprofen (MOTRIN) 600 mg tablet         Patient Instructions     Follow up with PCP in 3-5 days  Proceed to  ER if symptoms worsen  Chief Complaint     Chief Complaint   Patient presents with    Shoulder Pain     right shoulder pain x 1 week, denies injury, but reports of "working out "         History of Present Illness   Shantanu Vázquez presents to the clinic c/o      59-year-old male presents for evaluation of right shoulder pain that has been ongoing for approximately 7 days  He currently rates his pain at a 5/10 with motion, 1/10 at rest   He denies any numbness or tingling in the right arm  He is right-hand dominant  He states that work he does a lot of repetitive motion with right shoulder, also has a bandage placed drums with the right shoulder  He denies any fevers, chills, shortness of breath, difficulty breathing, nausea vomiting/ diarrhea  Denies any previous injuries in the right shoulder  Denies any falls or trauma in the right shoulder  He denies any surgeries in the right shoulder  Shoulder Pain          Review of Systems   Review of Systems   Constitutional: Negative  Respiratory: Negative  Cardiovascular: Negative  Musculoskeletal: Positive for arthralgias           Current Medications     Long-Term Prescriptions   Medication Sig Dispense Refill    alfuzosin (UROXATRAL) 10 mg 24 hr tablet Take 1 tablet (10 mg total) by mouth daily 30 tablet 0    ibuprofen (MOTRIN) 600 mg tablet Take 1 tablet (600 mg total) by mouth every 8 (eight) hours as needed for mild pain 30 tablet 0    Icosapent Ethyl (VASCEPA) 1 g CAPS Take by mouth      multivitamin (THERAGRAN) TABS Take 1 tablet by mouth daily      pantoprazole (PROTONIX) 40 mg tablet Take 40 mg by mouth daily      rosuvastatin (CRESTOR) 10 MG tablet Take 10 mg by mouth daily         Current Allergies     Allergies as of 03/09/2018    (No Known Allergies)            The following portions of the patient's history were reviewed and updated as appropriate: allergies, current medications, past family history, past medical history, past social history, past surgical history and problem list     Objective   /90   Pulse 86   Temp 97 9 °F (36 6 °C) (Temporal)   Resp 18   Ht 5' 6" (1 676 m)   Wt 88 5 kg (195 lb)   SpO2 97%   BMI 31 47 kg/m²        Physical Exam     Physical Exam   Constitutional: He is oriented to person, place, and time  He appears well-developed and well-nourished  HENT:   Head: Normocephalic and atraumatic  Right Ear: External ear normal    Left Ear: External ear normal    Nose: Nose normal    Mouth/Throat: Oropharynx is clear and moist    Eyes: Conjunctivae and EOM are normal  Pupils are equal, round, and reactive to light  Right eye exhibits no discharge  Left eye exhibits no discharge  Neck: Normal range of motion  Neck supple  No tracheal deviation present  Cardiovascular: Normal rate, regular rhythm and normal heart sounds  Exam reveals no gallop and no friction rub  No murmur heard  Pulmonary/Chest: Effort normal and breath sounds normal  No stridor  No respiratory distress  He has no wheezes  He has no rales  He exhibits no tenderness  Musculoskeletal: Normal range of motion  Right shoulder: He exhibits pain  He exhibits normal range of motion, no bony tenderness, no swelling and no spasm  Negative Yergason's test   Negative drop-arm test   There is some tenderness to palpation of the muscle on the right side  Full active range of motion  Fully neurovascularly intact  Lymphadenopathy:     He has no cervical adenopathy  Neurological: He is alert and oriented to person, place, and time     Skin: Skin is warm and dry    Psychiatric: He has a normal mood and affect  His behavior is normal    Nursing note and vitals reviewed

## 2018-03-09 NOTE — PATIENT INSTRUCTIONS
Shoulder Sprain, Ambulatory Care   GENERAL INFORMATION:   A shoulder sprain  happens when a ligament in your shoulder is stretched or torn  Ligaments are the tough tissues that connect bones  Ligaments allow you to lift, lower, and rotate your arm  A shoulder sprain is usually caused by a fall onto your outstretched arm  Common symptoms include the following:   · Bruising or changes in skin color    · Pain and stiffness, especially with movement    · Swelling and tenderness  Seek immediate care for the following symptoms:   · Cold or clammy skin    · Increased pain, even after taking pain medicine    · Shortness of breath    · Sudden, sharp chest pain on the same side as your injury    · Throat tightness or trouble swallowing    · New or increased trouble moving and using your arm, hand, or fingers  Treatment for a shoulder sprain  may include a support device, such as a brace, sling, or splint  These devices limit movement and protect your joint  Treatment may also include pain medicine, physical therapy, or surgery if the ligament does not heal   Care for a shoulder sprain:   · Rest  your shoulder for at least 48 hours  Avoid activities that cause pain  Return to normal activities as directed  · Apply ice  on your shoulder for 15 to 20 minutes every hour or as directed  Use an ice pack, or put crushed ice in a plastic bag  Cover it with a towel  Ice helps prevent tissue damage and decreases swelling and pain  · Exercise your shoulder  as directed  You will need to do light exercises to decrease shoulder stiffness  Check with your healthcare provider before you return to your normal activities or sports  When you wear a brace, sling, or splint, do the following:   · Wear your brace, sling, or splint as directed  You may need to wear it all the time and take it off only to bathe or do exercises  Ask your healthcare provider how long you should wear it  · Keep your skin clean and dry    Padding under your armpit will help absorb sweat and prevent sores on your skin  · Do not hunch your shoulders  This may cause pain  Keep your shoulders relaxed  · Position the sling over your arm and hand so that it also covers your knuckles  This will help the sling support your wrist and hand  Position your wrist higher than your elbow  Your wrist may start to hurt or go numb if your sling is too short  Follow up with your healthcare provider as directed:  Write down your questions so you remember to ask them during your visits  CARE AGREEMENT:   You have the right to help plan your care  Learn about your health condition and how it may be treated  Discuss treatment options with your caregivers to decide what care you want to receive  You always have the right to refuse treatment  The above information is an  only  It is not intended as medical advice for individual conditions or treatments  Talk to your doctor, nurse or pharmacist before following any medical regimen to see if it is safe and effective for you  © 2014 [address] is for End User's use only and may not be sold, redistributed or otherwise used for commercial purposes  All illustrations and images included in CareNotes® are the copyrighted property of A D A M , Inc  or Tommy Wang

## 2018-03-19 ENCOUNTER — HOSPITAL ENCOUNTER (OUTPATIENT)
Dept: RADIOLOGY | Age: 58
Discharge: HOME | End: 2018-03-19
Payer: COMMERCIAL

## 2018-03-19 DIAGNOSIS — R35.0 FREQUENCY OF URINATION: ICD-10-CM

## 2018-03-19 DIAGNOSIS — N13.8 BPH WITH OBSTRUCTION/LOWER URINARY TRACT SYMPTOMS: ICD-10-CM

## 2018-03-19 DIAGNOSIS — N40.1 BPH WITH OBSTRUCTION/LOWER URINARY TRACT SYMPTOMS: ICD-10-CM

## 2018-03-19 PROCEDURE — 51798 US URINE CAPACITY MEASURE: CPT

## 2018-04-02 DIAGNOSIS — R35.0 URINARY FREQUENCY: ICD-10-CM

## 2018-04-02 DIAGNOSIS — R35.0 FREQUENCY OF URINATION: ICD-10-CM

## 2018-04-02 DIAGNOSIS — N40.1 BPH WITH OBSTRUCTION/LOWER URINARY TRACT SYMPTOMS: ICD-10-CM

## 2018-04-02 DIAGNOSIS — N13.8 BPH WITH OBSTRUCTION/LOWER URINARY TRACT SYMPTOMS: ICD-10-CM

## 2018-04-02 DIAGNOSIS — R35.1 NOCTURIA: ICD-10-CM

## 2018-04-02 RX ORDER — ALFUZOSIN HYDROCHLORIDE 10 MG/1
10 TABLET, EXTENDED RELEASE ORAL DAILY
Qty: 30 TABLET | Refills: 0 | Status: SHIPPED | OUTPATIENT
Start: 2018-04-02 | End: 2018-05-27 | Stop reason: SDUPTHER

## 2018-05-08 ENCOUNTER — TELEPHONE (OUTPATIENT)
Dept: UROLOGY | Facility: CLINIC | Age: 58
End: 2018-05-08

## 2018-05-08 NOTE — TELEPHONE ENCOUNTER
PT RETURNED [address] SOMEONE TO PLEASE CALL HIM BACK SOON   HE HAS TO GO BACK TO WORK SOON

## 2018-05-08 NOTE — TELEPHONE ENCOUNTER
Patient called  Patient would like results of his US kidney and bladder with pvr that was done on 3/19/18  Called and spoke to patient  Patient is upset that this was done on 3/19/18 and he has not received the results of it yet  Patient aware that I will have an advanced practitioner review the results and he will receive a call back once the [address] is reviewed

## 2018-05-08 NOTE — TELEPHONE ENCOUNTER
Renal ultrasound reviewed  Left renal cyst is simple and does not warrant any further work up  PVR is minimal  If patient is pleased with improvement of urinary symptoms with use of Alfuzosin then can follow up in 1 year  If patients symptoms have not improved then would recommend cystoscopy

## 2018-05-08 NOTE — TELEPHONE ENCOUNTER
Returned patients call about US results  Explained results in detail so patient understood  Explained about urinary symptoms and if he should have any he is to call the office to schedule appointment, if not we will see him in 1 years time  Patient understands and provided knowledge to understanding US results  Informed patient if he has any questions he can call the office  Patient requested to fax report to PCP whom is part of [facility]  Informed patient that [facility] has the [employer] system and can view results from [address]

## 2018-05-26 DIAGNOSIS — N13.8 BPH WITH OBSTRUCTION/LOWER URINARY TRACT SYMPTOMS: ICD-10-CM

## 2018-05-26 DIAGNOSIS — R35.0 URINARY FREQUENCY: ICD-10-CM

## 2018-05-26 DIAGNOSIS — N40.1 BPH WITH OBSTRUCTION/LOWER URINARY TRACT SYMPTOMS: ICD-10-CM

## 2018-05-26 DIAGNOSIS — R35.1 NOCTURIA: ICD-10-CM

## 2018-05-26 DIAGNOSIS — R35.0 FREQUENCY OF URINATION: ICD-10-CM

## 2018-05-27 RX ORDER — ALFUZOSIN HYDROCHLORIDE 10 MG/1
10 TABLET, EXTENDED RELEASE ORAL DAILY
Qty: 30 TABLET | Refills: 0 | Status: SHIPPED | OUTPATIENT
Start: 2018-05-27 | End: 2018-06-03

## 2018-06-03 ENCOUNTER — OFFICE VISIT (OUTPATIENT)
Dept: URGENT CARE | Age: 58
End: 2018-06-03
Payer: COMMERCIAL

## 2018-06-03 VITALS
SYSTOLIC BLOOD PRESSURE: 143 MMHG | DIASTOLIC BLOOD PRESSURE: 85 MMHG | HEART RATE: 73 BPM | RESPIRATION RATE: 16 BRPM | WEIGHT: 195 LBS | BODY MASS INDEX: 32.49 KG/M2 | HEIGHT: 65 IN | TEMPERATURE: 97.2 F | OXYGEN SATURATION: 97 %

## 2018-06-03 DIAGNOSIS — M77.12 LEFT LATERAL EPICONDYLITIS: ICD-10-CM

## 2018-06-03 DIAGNOSIS — S46.912A LEFT SHOULDER STRAIN, INITIAL ENCOUNTER: Primary | ICD-10-CM

## 2018-06-03 PROCEDURE — G0382 LEV 3 HOSP TYPE B ED VISIT: HCPCS | Performed by: FAMILY MEDICINE

## 2018-06-03 PROCEDURE — S9083 URGENT CARE CENTER GLOBAL: HCPCS | Performed by: FAMILY MEDICINE

## 2018-06-03 RX ORDER — IBUPROFEN 600 MG/1
600 TABLET ORAL EVERY 8 HOURS SCHEDULED
Qty: 30 TABLET | Refills: 0 | Status: SHIPPED | OUTPATIENT
Start: 2018-06-03 | End: 2018-06-13

## 2018-06-03 NOTE — LETTER
Meredith 3, 2018     Patient: Dong Bruno   YOB: 1960   Date of Visit: 6/3/2018       To Whom It May Concern:    Please excuse Dong Bruno  from work 06/03/2018 and 06/04/2018 due to injury           Sincerely,        Tesfaye Loredo PA-C    CC: No Recipients

## 2018-06-03 NOTE — PROGRESS NOTES
[facility]        NAME: Ami Eugene is a 62 y o  male  : 1960    MRN: [id number]  DATE: Meredith 3, 2018  TIME: 10:03 AM    Assessment and Plan   Left shoulder strain, initial encounter [[id number]]  1  Left shoulder strain, initial encounter  ibuprofen (MOTRIN) 600 mg tablet   2  Left lateral epicondylitis  ibuprofen (MOTRIN) 600 mg tablet         Patient Instructions       Follow up with PCP in 3-5 days  Proceed to  ER if symptoms worsen  Chief Complaint     Chief Complaint   Patient presents with    Arm Pain     left forarm and shoulder at times  painful most when leaning on it or in bed  does some lifting at work, and goes to [employer]  History of Present Illness       Patient is here for evaluation of pain in his left shoulder and left elbow  Patient states the symptoms started about a month ago  He denies any specific injury or trauma to the area  He does work 2 jobs and does a lot of lifting in each job  He also works out at [employer]  Patient has pain in his left lateral elbow which is sharp at times  He also has an ache in the left shoulder which hurts if he lays on the shoulder wrong or if he moves it a certain way  He has taken 1 dose of ibuprofen which did help a little bit  He denies any numbness, tingling, weakness, swelling, redness, rash  Review of Systems   Review of Systems   Constitutional: Negative  HENT: Negative  Skin: Negative  Neurological: Negative            Current Medications       Current Outpatient Prescriptions:     multivitamin (THERAGRAN) TABS, Take 1 tablet by mouth daily, Disp: , Rfl:     Omega-3 Fatty Acids (FISH OIL CONCENTRATE PO), Take by mouth, Disp: , Rfl:     rosuvastatin (CRESTOR) 10 MG tablet, Take 10 mg by mouth daily, Disp: , Rfl:     Saw Palmetto, Serenoa repens, 450 MG CAPS, Take by mouth, Disp: , Rfl:     ibuprofen (MOTRIN) 600 mg tablet, Take 1 tablet (600 mg total) by mouth every 8 (eight) hours for 10 days, Disp: 30 tablet, Rfl: 0    Current Allergies     Allergies as of 06/03/2018    (No Known Allergies)            The following portions of the patient's history were reviewed and updated as appropriate: allergies, current medications, past family history, past medical history, past social history, past surgical history and problem list      Past Medical History:   Diagnosis Date    BPH (benign prostatic hyperplasia)     GERD (gastroesophageal reflux disease)     Hyperlipidemia        Past Surgical History:   Procedure Laterality Date    COLONOSCOPY      ESOPHAGOGASTRODUODENOSCOPY      NE COLONOSCOPY FLX DX W/COLLJ SPEC WHEN PFRMD N/A 2/13/2017    Procedure: COLONOSCOPY;  Surgeon: Kaci Jennings MD;  Location[facility]; Service: Gastroenterology       Family History   Problem Relation Age of Onset   Genny Arnold Breast cancer Mother     Diabetes Mother     Hypertension Mother     Lung cancer Mother          Medications have been verified  Objective   /85 (BP Location: Right leg)   Pulse 73   Temp (!) 97 2 °F (36 2 °C)   Resp 16   Ht 5' 5" (1 651 m)   Wt 88 5 kg (195 lb)   SpO2 97%   BMI 32 45 kg/m²        Physical Exam     Physical Exam   Constitutional: He is oriented to person, place, and time  He appears well-developed and well-nourished  No distress  Musculoskeletal:   Full range of motion of the left elbow with pain at the lateral epicondyle on resisted wrist extension  Strength 5/5 in the left upper extremity with a negative drop-arm test   Tenderness present of the left lateral epicondyle and left anterior superior shoulder  No crepitation in the elbow or shoulder  No erythema  No warmth  Full range of motion of the left shoulder with pain at extremes  Neurological: He is alert and oriented to person, place, and time  He exhibits normal muscle tone  Skin: Skin is warm and dry  No rash noted  No erythema  Psychiatric: He has a normal mood and affect   His behavior is normal    Nursing note and vitals reviewed

## 2018-07-30 ENCOUNTER — OFFICE VISIT (OUTPATIENT)
Dept: OBGYN CLINIC | Facility: CLINIC | Age: 58
End: 2018-07-30
Payer: COMMERCIAL

## 2018-07-30 VITALS
SYSTOLIC BLOOD PRESSURE: 135 MMHG | DIASTOLIC BLOOD PRESSURE: 78 MMHG | BODY MASS INDEX: 30.53 KG/M2 | WEIGHT: 190 LBS | HEIGHT: 66 IN | HEART RATE: 78 BPM

## 2018-07-30 DIAGNOSIS — M75.102 ROTATOR CUFF SYNDROME OF LEFT SHOULDER: ICD-10-CM

## 2018-07-30 DIAGNOSIS — M77.12 LATERAL EPICONDYLITIS OF LEFT ELBOW: Primary | ICD-10-CM

## 2018-07-30 PROCEDURE — 99203 OFFICE O/P NEW LOW 30 MIN: CPT | Performed by: INTERNAL MEDICINE

## 2018-07-30 RX ORDER — NAPROXEN 500 MG/1
500 TABLET ORAL 2 TIMES DAILY WITH MEALS
Qty: 30 TABLET | Refills: 0 | Status: SHIPPED | OUTPATIENT
Start: 2018-07-30

## 2018-07-30 NOTE — PROGRESS NOTES
Assessment/Plan:  Assessment/Plan   Diagnoses and all orders for this visit:    Lateral epicondylitis of left elbow  -     Ambulatory referral to Physical Therapy; Future  -     naproxen (NAPROSYN) 500 mg tablet; Take 1 tablet (500 mg total) by mouth 2 (two) times a day with meals  -     Tennis elbow strap    Rotator cuff syndrome of left shoulder  -     Ambulatory referral to Physical Therapy; Future  -     naproxen (NAPROSYN) 500 mg tablet; Take 1 tablet (500 mg total) by mouth 2 (two) times a day with meals  -     Tennis elbow strap        Left lateral epicondylitis and mild subscapularis and external rotator cuff weakness  I have referred him to PT for rehabilitation  He will start daily home exercise program once he starts his physical therapy rehabilitation  Naproxen for pain control  Lateral epicondylitis elbow strap which he will wear consistently when he is up and about or when he is engaged in repetitive use of his left upper extremity  He will follow up on as needed basis  Subjective:   Patient ID: Margaret Cai is a 62 y o  male  HPI    Mr Elicia Ferguson is a right-hand-dominant 51-year-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), , [employer] who presents for initial evaluation of an acute onset left elbow pain which he initially developed sometime in May  He was acutely treated at [place] [facility] on 6/3/2018  On today's presentation, he reports significant improvement of his pain  However, he reports that his current baseline pain is exacerbated with activities including drumming, moving things are lifting things, setting ADL activities, working out  The pain is primarily located in the lateral aspect of his elbow  He denies any shoulder pain, upper extremity numbness, tingling, or focal weakness today      The following portions of the patient's history were reviewed and updated as appropriate: allergies, current medications, past family history, past medical history, past social history, past surgical history and problem list     Review of Systems  Review of Systems   Constitutional: Negative  HENT: Negative  Eyes: Negative  Respiratory: Negative  Cardiovascular: Negative  Musculoskeletal:        As per history of present illness   Skin: Negative  Neurological:  Negative  Psychiatric/Behavioral: Negative  Objective:  Vitals:    07/30/18 0828   BP: 135/78   Pulse: 78   Weight: 86 2 kg (190 lb)   Height: 5' 6" (1 676 m)       Left Elbow Exam     Tenderness   The patient is experiencing tenderness in the lateral epicondyle  Range of Motion   The patient has normal left elbow ROM  Muscle Strength   The patient has normal left elbow strength  Tests Varus: negative  Valgus: negative  Tinel's Sign (cubital tunnel): negative    Other   Erythema: absent  Sensation: normal  Pulse: present    Comments:  Exquisite lateral epicondylar pain production with resisted wrist extension while elbow is in full extension as well as milder pain production with resisted wrist extension with elbow at 90° of flexion  Left Shoulder Exam     Tenderness   The patient is experiencing no tenderness  Range of Motion   The patient has normal left shoulder ROM  Muscle Strength   External Rotation: 4/5   Subscapularis: 4/5     Tests   Apprehension: negative  Cross Arm: negative  Drop Arm: negative  Hawkin's test: negative  Impingement: negative  Sulcus: absent    Other   Erythema: absent  Sensation: normal  Pulse: present             Physical Exam  Constitutional: Oriented to person, place, and time  Well-developed and well-nourished  HENT:   Head: Normocephalic and atraumatic  Eyes: Conjunctivae are normal    Cardiovascular: Normal rate  Pulmonary/Chest: Effort normal    Neurological: Alert and oriented to person, place, and time  Skin: Skin is warm and dry  Psychiatric: Normal mood and affect

## 2018-08-13 ENCOUNTER — EVALUATION (OUTPATIENT)
Dept: PHYSICAL THERAPY | Facility: CLINIC | Age: 58
End: 2018-08-13
Payer: COMMERCIAL

## 2018-08-13 DIAGNOSIS — M77.12 LATERAL EPICONDYLITIS OF LEFT ELBOW: Primary | ICD-10-CM

## 2018-08-13 DIAGNOSIS — M75.102 ROTATOR CUFF SYNDROME OF LEFT SHOULDER: ICD-10-CM

## 2018-08-13 PROCEDURE — 97162 PT EVAL MOD COMPLEX 30 MIN: CPT | Performed by: PHYSICAL THERAPIST

## 2018-08-13 PROCEDURE — G8979 MOBILITY GOAL STATUS: HCPCS | Performed by: PHYSICAL THERAPIST

## 2018-08-13 PROCEDURE — G8978 MOBILITY CURRENT STATUS: HCPCS | Performed by: PHYSICAL THERAPIST

## 2018-08-13 PROCEDURE — 97140 MANUAL THERAPY 1/> REGIONS: CPT | Performed by: PHYSICAL THERAPIST

## 2018-08-13 NOTE — PROGRESS NOTES
PT Evaluation     Today's date: 2018  Patient name: Ami Eugene  : 1960  MRN: [id number]  Referring provider: Betty Michaels MD  Dx:   Encounter Diagnosis     ICD-10-CM    1  Lateral epicondylitis of left elbow M77 12 Ambulatory referral to Physical Therapy   2  Rotator cuff syndrome of left shoulder M75 102 Ambulatory referral to Physical Therapy                  Assessment  Impairments: abnormal or restricted ROM, abnormal movement, activity intolerance and pain with function    Assessment details: Ami Eugene is a pleasant 62 y o  male who presents with signs and symptoms correlating with referring diagnosis  Etiology appears to be from repetitive lifting and shoveling back in March  No further referral appears necessary at this time based upon examination results  The patient's greatest concerns are getting back to his work s pain and lifting s pain  He presents with a movement impairment diagnosis of elbow extension hypomobility  Which presents with radioulnar hypomobility, decreased scapular strength and thoracic joint hypomoblity which is limiting his ability to lift heavier objects or navigate machinery s pain  Negative prognostic indicators:chronicity  Positive prognostic indicators: good motivation  Please contact me if you have any further questions or recommendations  Thank you very much for the kind referral         Goals  STGs  1  Decrease pain by 20% in 2-4 weeks  2  Improve elbow ROM s pain in 2-4 weeks  3  Improve elbow/shoulder strength by 1/3 grade in 2-4 weeks  4  Lift 20# s pain in 4 weeks  LTGs  1  Decrease pain by 60% in 6-8 weeks  2  Improve walking tolerance to >30 minutes in 6-8 weeks  3  Perform job/ gym Bench press (90) activities without pain in 6-8 weeks  4  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        Plan  Patient would benefit from: skilled physical therapy  Planned therapy interventions: manual therapy, therapeutic training, stretching, strengthening, therapeutic activities, therapeutic exercise, patient education and activity modification  Frequency: 2x week  Duration in weeks: 8  Treatment plan discussed with: patient        Subjective Evaluation    History of Present Illness  Mechanism of injury: Patient presents with lateral elbow pain from shoveling snow moving L to R and he felt a pulling in the muscle  He also did feel like there was a weight on his shoulder until 2 months back  He also was lifting speakers and going to the gym which made it worse  Neuro signs:Tingling on lateral elbow and forearm  Red flags: none  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)  Past Medical History:   Pain  At best pain ratin  At worst pain ratin  Location: lateral epicondyle and lateral extensor mass  Quality: pulling    Hand dominance: right    Treatments  Previous treatment: medication  Patient Goals  Patient goals for therapy: decreased pain and increased motion          Objective     Active Range of Motion     Left Elbow   Flexion: WFL  Extension: with pain    Additional Active Range of Motion Details    Sensation: WNL  Reflexes:NT  Myotomes: WNL  Neck ROM: WNL  Shoulder ROM: WNL except shoulder IR painful  Elbow: Flexion s pain, ext in supination WNL, ext in pronation  Salas Marker:-  Cozen's: + in elbow ext  3rd finger test:-  Mid trap: R 4/5 L 4/5  Low trap: R 4/5 L 4+/5      KELLEY: posterior lateral radial glide improved extension in pronation  Palpation: lateral epicondyle        Flowsheet Rows      Most Recent Value   PT/OT G-Codes   Current Score  59   Projected Score  71

## 2018-08-13 NOTE — PROGRESS NOTES
Daily Note     Today's date: 2018  Patient name: Olga Benson  : 1960  MRN: [id number]  Referring provider: Claudia Urbina MD  Dx:   Encounter Diagnoses   Name Primary?  Lateral epicondylitis of left elbow Yes    Rotator cuff syndrome of left shoulder                   Subjective: See IE      Objective: See treatment diary below      Assessment: Tolerated session well and improved extension ROM p mobilizations        Plan: Continue with current plan    Precautions: None    Daily Treatment Diary       Manuals          Supine post/lat radial head grade 3-4 5'         graston to L lateral ext mass 5'                                        Exercise Diary          pulleys          IR strap ST          L Wrist ext ST          SA alphabet          Gripping- elbow ext/flex          Ball on wall circles          Box lift #15 for form          Pball taps on wall          Body blade if rudi          Prone Ts/ys          Thoracic ext over chair          Foam roller T-spine                                                                                                    Modalities

## 2018-08-17 ENCOUNTER — APPOINTMENT (OUTPATIENT)
Dept: PHYSICAL THERAPY | Facility: CLINIC | Age: 58
End: 2018-08-17
Payer: COMMERCIAL

## 2018-08-20 ENCOUNTER — APPOINTMENT (OUTPATIENT)
Dept: PHYSICAL THERAPY | Facility: CLINIC | Age: 58
End: 2018-08-20
Payer: COMMERCIAL

## 2018-08-23 ENCOUNTER — OFFICE VISIT (OUTPATIENT)
Dept: PHYSICAL THERAPY | Facility: CLINIC | Age: 58
End: 2018-08-23
Payer: COMMERCIAL

## 2018-08-23 DIAGNOSIS — M75.102 ROTATOR CUFF SYNDROME OF LEFT SHOULDER: ICD-10-CM

## 2018-08-23 DIAGNOSIS — M77.12 LATERAL EPICONDYLITIS OF LEFT ELBOW: Primary | ICD-10-CM

## 2018-08-23 PROCEDURE — 97110 THERAPEUTIC EXERCISES: CPT | Performed by: PHYSICAL THERAPIST

## 2018-08-23 PROCEDURE — 97112 NEUROMUSCULAR REEDUCATION: CPT | Performed by: PHYSICAL THERAPIST

## 2018-08-23 PROCEDURE — G8992 OTHER PT/OT  D/C STATUS: HCPCS | Performed by: PHYSICAL THERAPIST

## 2018-08-23 PROCEDURE — 97140 MANUAL THERAPY 1/> REGIONS: CPT | Performed by: PHYSICAL THERAPIST

## 2018-08-23 PROCEDURE — G8991 OTHER PT/OT GOAL STATUS: HCPCS | Performed by: PHYSICAL THERAPIST

## 2018-08-23 NOTE — PROGRESS NOTES
Daily Note     Today's date: 2018  Patient name: Shannan Wallace  : 1960  MRN: [id number]  Referring provider: Deirdre Santoyo MD  Dx:   Encounter Diagnoses   Name Primary?  Lateral epicondylitis of left elbow Yes    Rotator cuff syndrome of left shoulder                   Subjective: He states he got back to the gym and he has been feeling better since last visit  Objective: See treatment diary below      Assessment: Tolerated session well and fatigued with body blade  Good form with box lifts but cues needed for breathing and keeping box close to body and could use more weight nv       Plan: Continue with current plan    Precautions: None    Daily Treatment Diary       Manuals         Supine post/lat radial head grade 3-4 5' 5'        graston to L lateral ext mass 5' 5'                                       Exercise Diary          pulleys  3'        IR strap ST  4x :10        L Wrist ext ST  3x :20        SA alphabet  1x wt nv        Gripping- elbow ext/flex          Ball on wall circles  20x        Box lift #15 for form  10x        Pball taps on wall  3x20        Body blade at 90 a/p er/ir  5x:30        Prone Ts/ys  2x10        Thoracic ext over chair  10x :05        Foam roller T-spine                                                                                                    Modalities

## 2018-08-27 ENCOUNTER — APPOINTMENT (OUTPATIENT)
Dept: PHYSICAL THERAPY | Facility: CLINIC | Age: 58
End: 2018-08-27
Payer: COMMERCIAL

## 2018-08-29 ENCOUNTER — APPOINTMENT (OUTPATIENT)
Dept: PHYSICAL THERAPY | Facility: CLINIC | Age: 58
End: 2018-08-29
Payer: COMMERCIAL

## 2018-11-16 NOTE — PROGRESS NOTES
Dong Bruno has made good functional progress with physical therapy  he was educated and updated in his home exercise program  Jessicapatrice Mcghee will be discharged from formal therapy due to independent HEP but will follow up as needed

## 2018-12-07 ENCOUNTER — TELEPHONE (OUTPATIENT)
Dept: UROLOGY | Facility: AMBULATORY SURGERY CENTER | Age: 58
End: 2018-12-07

## 2018-12-07 DIAGNOSIS — N40.1 BENIGN LOCALIZED PROSTATIC HYPERPLASIA WITH LOWER URINARY TRACT SYMPTOMS (LUTS): Primary | ICD-10-CM

## 2018-12-07 NOTE — TELEPHONE ENCOUNTER
Spoke with patient, patient said he is doing fine but he is wondering if he can get an MRI done? His Dad has some health issues  (he didn't say what) and is in radiation, but now patient is concerned  Please call patient back

## 2018-12-10 NOTE — TELEPHONE ENCOUNTER
Left another message for patient stating he should call back and schedule appt with provider to discuss concerns as we are unable to reach him  Schedule with call center when patient calls back

## 2018-12-10 NOTE — TELEPHONE ENCOUNTER
Pt scheduled 1/2/2019  Notes: frequent urination/family hx of cancer   see chery in the past   Made On:  Change Notes:  Change Notes: 12/10/2018 9:59 AM  12/10/2018 10:00 AM  12/10/2018 10:00 AM By:  By:  By: Matilde Reagan [[id number]] (ES)  Matilde Reagan [[id number]] (ES)  Matilde Reagan [[id number]] (ES)

## 2018-12-10 NOTE — TELEPHONE ENCOUNTER
Spoke with patient  He is requesting an order for MRI prostate  Explained to patient he will require an appt with provider to have RISSA and provider will decide what imaging is appropriate for patient  Patient states his father has prostate cancer and found out from MRI  Patient was last seen here 1/30/18 and advised to f/u in 6 wks for PVR/uroflow and to see effectiveness of adding alfuzosin to his medication regimen, however he did not  Advised patient an appt is necessary to discuss his concerns with provider  Patient agreed and transferred to call center to schedule

## 2018-12-26 ENCOUNTER — TRANSCRIBE ORDERS (OUTPATIENT)
Dept: LAB | Facility: CLINIC | Age: 58
End: 2018-12-26

## 2018-12-26 ENCOUNTER — APPOINTMENT (OUTPATIENT)
Dept: LAB | Facility: CLINIC | Age: 58
End: 2018-12-26
Payer: COMMERCIAL

## 2018-12-26 ENCOUNTER — TELEPHONE (OUTPATIENT)
Dept: UROLOGY | Facility: AMBULATORY SURGERY CENTER | Age: 58
End: 2018-12-26

## 2018-12-26 DIAGNOSIS — N40.1 BENIGN LOCALIZED PROSTATIC HYPERPLASIA WITH LOWER URINARY TRACT SYMPTOMS (LUTS): ICD-10-CM

## 2018-12-26 LAB — PSA SERPL-MCNC: 0.8 NG/ML (ref 0–4)

## 2018-12-26 PROCEDURE — 84153 ASSAY OF PSA TOTAL: CPT

## 2018-12-26 NOTE — TELEPHONE ENCOUNTER
LM for patient to return call to clarify what he is looking for  Does patient think that he might have a UTI, has he seen blood in his urine  Patient has upcoming ov and we can collect sterile sample here is no urgent issues

## 2018-12-26 NOTE — TELEPHONE ENCOUNTER
Pt returned call back from clinical I asked if he was having symptoms ,pt was requesting psa ordered which I informed him order was in epic and he will be going [facility] prior to 01/02/19 appointment

## 2018-12-28 ENCOUNTER — TRANSCRIBE ORDERS (OUTPATIENT)
Dept: LAB | Facility: CLINIC | Age: 58
End: 2018-12-28

## 2018-12-28 ENCOUNTER — APPOINTMENT (OUTPATIENT)
Dept: LAB | Facility: CLINIC | Age: 58
End: 2018-12-28
Payer: COMMERCIAL

## 2018-12-28 DIAGNOSIS — Z00.00 ROUTINE GENERAL MEDICAL EXAMINATION AT A HEALTH CARE FACILITY: ICD-10-CM

## 2018-12-28 DIAGNOSIS — Z12.5 SPECIAL SCREENING FOR MALIGNANT NEOPLASM OF PROSTATE: ICD-10-CM

## 2018-12-28 DIAGNOSIS — I10 ESSENTIAL HYPERTENSION, MALIGNANT: ICD-10-CM

## 2018-12-28 DIAGNOSIS — I10 ESSENTIAL HYPERTENSION, MALIGNANT: Primary | ICD-10-CM

## 2018-12-28 LAB
ALBUMIN SERPL BCP-MCNC: 4 G/DL (ref 3.5–5)
ALP SERPL-CCNC: 118 U/L (ref 46–116)
ALT SERPL W P-5'-P-CCNC: 37 U/L (ref 12–78)
ANION GAP SERPL CALCULATED.3IONS-SCNC: 7 MMOL/L (ref 4–13)
AST SERPL W P-5'-P-CCNC: 18 U/L (ref 5–45)
BACTERIA UR QL AUTO: ABNORMAL /HPF
BASOPHILS # BLD AUTO: 0.06 THOUSANDS/ΜL (ref 0–0.1)
BASOPHILS NFR BLD AUTO: 1 % (ref 0–1)
BILIRUB SERPL-MCNC: 0.5 MG/DL (ref 0.2–1)
BILIRUB UR QL STRIP: NEGATIVE
BUN SERPL-MCNC: 11 MG/DL (ref 5–25)
CALCIUM SERPL-MCNC: 9.3 MG/DL (ref 8.3–10.1)
CHLORIDE SERPL-SCNC: 104 MMOL/L (ref 100–108)
CHOLEST SERPL-MCNC: 225 MG/DL (ref 50–200)
CLARITY UR: CLEAR
CO2 SERPL-SCNC: 29 MMOL/L (ref 21–32)
COLOR UR: YELLOW
CREAT SERPL-MCNC: 0.89 MG/DL (ref 0.6–1.3)
EOSINOPHIL # BLD AUTO: 0.06 THOUSAND/ΜL (ref 0–0.61)
EOSINOPHIL NFR BLD AUTO: 1 % (ref 0–6)
ERYTHROCYTE [DISTWIDTH] IN BLOOD BY AUTOMATED COUNT: 15.9 % (ref 11.6–15.1)
GFR SERPL CREATININE-BSD FRML MDRD: 94 ML/MIN/1.73SQ M
GLUCOSE P FAST SERPL-MCNC: 100 MG/DL (ref 65–99)
GLUCOSE UR STRIP-MCNC: NEGATIVE MG/DL
HCT VFR BLD AUTO: 50.2 % (ref 36.5–49.3)
HDLC SERPL-MCNC: 34 MG/DL (ref 40–60)
HGB BLD-MCNC: 16.1 G/DL (ref 12–17)
HGB UR QL STRIP.AUTO: ABNORMAL
IMM GRANULOCYTES # BLD AUTO: 0.02 THOUSAND/UL (ref 0–0.2)
IMM GRANULOCYTES NFR BLD AUTO: 0 % (ref 0–2)
KETONES UR STRIP-MCNC: NEGATIVE MG/DL
LDLC SERPL CALC-MCNC: 149 MG/DL (ref 0–100)
LEUKOCYTE ESTERASE UR QL STRIP: NEGATIVE
LYMPHOCYTES # BLD AUTO: 1.82 THOUSANDS/ΜL (ref 0.6–4.47)
LYMPHOCYTES NFR BLD AUTO: 39 % (ref 14–44)
MCH RBC QN AUTO: 26.9 PG (ref 26.8–34.3)
MCHC RBC AUTO-ENTMCNC: 32.1 G/DL (ref 31.4–37.4)
MCV RBC AUTO: 84 FL (ref 82–98)
MONOCYTES # BLD AUTO: 0.36 THOUSAND/ΜL (ref 0.17–1.22)
MONOCYTES NFR BLD AUTO: 8 % (ref 4–12)
NEUTROPHILS # BLD AUTO: 2.35 THOUSANDS/ΜL (ref 1.85–7.62)
NEUTS SEG NFR BLD AUTO: 51 % (ref 43–75)
NITRITE UR QL STRIP: NEGATIVE
NON-SQ EPI CELLS URNS QL MICRO: ABNORMAL /HPF
NONHDLC SERPL-MCNC: 191 MG/DL
NRBC BLD AUTO-RTO: 0 /100 WBCS
PH UR STRIP.AUTO: 5.5 [PH] (ref 4.5–8)
PLATELET # BLD AUTO: 210 THOUSANDS/UL (ref 149–390)
PMV BLD AUTO: 11.9 FL (ref 8.9–12.7)
POTASSIUM SERPL-SCNC: 4.1 MMOL/L (ref 3.5–5.3)
PROT SERPL-MCNC: 7.4 G/DL (ref 6.4–8.2)
PROT UR STRIP-MCNC: NEGATIVE MG/DL
PSA SERPL-MCNC: 0.7 NG/ML (ref 0–4)
RBC # BLD AUTO: 5.98 MILLION/UL (ref 3.88–5.62)
RBC #/AREA URNS AUTO: ABNORMAL /HPF
SODIUM SERPL-SCNC: 140 MMOL/L (ref 136–145)
SP GR UR STRIP.AUTO: 1.02 (ref 1–1.03)
TRIGL SERPL-MCNC: 210 MG/DL
TSH SERPL DL<=0.05 MIU/L-ACNC: 1.43 UIU/ML (ref 0.36–3.74)
UROBILINOGEN UR QL STRIP.AUTO: 0.2 E.U./DL
WBC # BLD AUTO: 4.67 THOUSAND/UL (ref 4.31–10.16)
WBC #/AREA URNS AUTO: ABNORMAL /HPF

## 2018-12-28 PROCEDURE — G0103 PSA SCREENING: HCPCS

## 2018-12-28 PROCEDURE — 81001 URINALYSIS AUTO W/SCOPE: CPT | Performed by: PHYSICIAN ASSISTANT

## 2018-12-28 PROCEDURE — 80061 LIPID PANEL: CPT

## 2018-12-28 PROCEDURE — 84443 ASSAY THYROID STIM HORMONE: CPT

## 2018-12-28 PROCEDURE — 85025 COMPLETE CBC W/AUTO DIFF WBC: CPT

## 2018-12-28 PROCEDURE — 80053 COMPREHEN METABOLIC PANEL: CPT

## 2018-12-28 PROCEDURE — 36415 COLL VENOUS BLD VENIPUNCTURE: CPT

## 2018-12-28 NOTE — PROGRESS NOTES
1/2/2019    Renee Chua  1960  [id number]    Discussion and Plan    Examination is consistent with mild BPH  Dietary and hydration recommendations provided help address urinary frequency  Postvoid residual is 30 cc today  He will otherwise return in 1 year with PSA prior to visit  All questions answered at this time  1  Urinary frequency  - POCT Measure PVR  - PSA Total, Diagnostic; Future    2  Nocturia      Assessment      Patient Active Problem List   Diagnosis    Nocturia    Urinary frequency    BPH with obstruction/lower urinary tract symptoms    Left shoulder strain, initial encounter    Left lateral epicondylitis       History of Present Illness    Alis Soliz is a 62 y o  male seen today in regards to a history of urinary frequency and family history of prostate cancer  Father is presently undergoing radiation therapy for adenocarcinoma of the prostate  Patient notes urinary frequency which is a somewhat improved with the use of saw palmetto  No interval hematuria urinary tract infection  Current PSA is 0 7 and stable  Urinary Symptom Assessment        Past Medical History  Past Medical History:   Diagnosis Date    BPH (benign prostatic hyperplasia)     GERD (gastroesophageal reflux disease)     Hyperlipidemia        Past Social History  Past Surgical History:   Procedure Laterality Date    COLONOSCOPY      ESOPHAGOGASTRODUODENOSCOPY      CT COLONOSCOPY FLX DX W/COLLJ SPEC WHEN PFRMD N/A 2/13/2017    Procedure: COLONOSCOPY;  Surgeon: Amira Canales MD;  Location: [facility]; Service: Gastroenterology       Past Family History  Family History   Problem Relation Age of Onset    Breast cancer Mother     Diabetes Mother     Hypertension Mother     Lung cancer Mother        Past Social history  Social History     Social History    Marital status: /Civil Union     Spouse name: N/A    Number of children: N/A    Years of education: N/A     Occupational History    Not on file  Social History Main Topics    Smoking status: Former Smoker     Quit date: 7/1/2017    Smokeless tobacco: Never Used    Alcohol use Yes      Comment: social    Drug use: No    Sexual activity: Not on file     Other Topics Concern    Not on file     Social History Narrative    No narrative on file       Current Medications  Current Outpatient Prescriptions   Medication Sig Dispense Refill    amoxicillin-clavulanate (AUGMENTIN) 875-125 mg per tablet TAKE 1 TABLET BY MOUTH TWICE A DAY UNTIL FINISHED  0    Azelastine HCl 137 MCG/SPRAY SOLN INSTILL 2 SPRAYS INTO EACH NOSTRIL TWICE A DAY  1    cefuroxime (CEFTIN) 500 mg tablet TAKE 1 TABLET BY MOUTH TWICE A DAY FOR 7 DAYS  0    [employer] ALLERGY RELIEF D  MG per tablet TAKE 1 TABLET BY MOUTH IN THE MORNING FOR 10 DAYS  0    fluticasone (FLONASE) 50 mcg/act nasal spray 2 sprays daily  1    ibuprofen (MOTRIN) 600 mg tablet Take 1 tablet (600 mg total) by mouth every 8 (eight) hours for 10 days 30 tablet 0    meclizine (ANTIVERT) 12 5 MG tablet Take 12 5 mg by mouth 2 (two) times a day  0    multivitamin (THERAGRAN) TABS Take 1 tablet by mouth daily      naproxen (NAPROSYN) 500 mg tablet Take 1 tablet (500 mg total) by mouth 2 (two) times a day with meals 30 tablet 0    Omega-3 Fatty Acids (FISH OIL CONCENTRATE PO) Take by mouth      predniSONE 20 mg tablet TAKE 2 TABLETS EVERY DAY FOR 3 DAYS THEN TAKE 1 TABLET EVERY DAY FOR 4 DAYS  0    rosuvastatin (CRESTOR) 10 MG tablet Take 10 mg by mouth daily      Saw Palmetto, Serenoa repens, 450 MG CAPS Take by mouth       No current facility-administered medications for this visit  Allergies  No Known Allergies    Past Medical History, Social History, Family History, medications and allergies were reviewed  Review of Systems  Review of Systems   Constitutional: Negative  HENT: Negative  Eyes: Negative  Respiratory: Negative  Cardiovascular: Negative  Gastrointestinal: Negative  Endocrine: Negative  Genitourinary: Positive for frequency  Negative for decreased urine volume, difficulty urinating, hematuria and urgency  Musculoskeletal: Negative  Skin: Negative  Neurological: Negative  Hematological: Negative  Psychiatric/Behavioral: Negative  Vitals  Vitals:    01/02/19 1408   BP: 128/76   BP Location: Left arm   Patient Position: Sitting   Cuff Size: Adult   Pulse: 80   Weight: 87 1 kg (192 lb)   Height: 5' 6 5" (1 689 m)         Physical Exam    Physical Exam   Constitutional: He is oriented to person, place, and time  He appears well-developed and well-nourished  HENT:   Head: Normocephalic and atraumatic  Eyes: Pupils are equal, round, and reactive to light  Neck: Normal range of motion  Cardiovascular: Normal rate, regular rhythm and normal heart sounds  Pulmonary/Chest: Effort normal and breath sounds normal  No accessory muscle usage  No respiratory distress  Abdominal: Soft  Normal appearance and bowel sounds are normal  There is no tenderness  Genitourinary: Rectum normal, prostate normal and penis normal  No penile tenderness  Genitourinary Comments: Prostate 35 g   Musculoskeletal: Normal range of motion  Neurological: He is alert and oriented to person, place, and time  Skin: Skin is warm, dry and intact  Psychiatric: He has a normal mood and affect  His speech is normal  Cognition and memory are normal    Nursing note and vitals reviewed        Results    Below listed labs, pathology results, and radiology images were personally reviewed:    Lab Results   Component Value Date/Time    PSA 0 7 12/28/2018 09:05 AM    PSA 0 7 04/02/2015 10:37 AM     Lab Results   Component Value Date    CALCIUM 9 3 12/28/2018    K 4 1 12/28/2018    CO2 29 12/28/2018     12/28/2018    BUN 11 12/28/2018    CREATININE 0 89 12/28/2018     Lab Results   Component Value Date    WBC 4 67 12/28/2018    HGB 16 1 12/28/2018    HCT 50 2 (H) 12/28/2018 MCV 84 12/28/2018     12/28/2018       No results found for this or any previous visit (from the past 1 hour(s)) ]  RENAL ULTRASOUND     INDICATION:   R35 0: Frequency of micturition  N40 1: Benign prostatic hyperplasia with lower urinary tract symptoms  N13 8: Other obstructive and reflux uropathy      COMPARISON: None     TECHNIQUE:   Ultrasound of the retroperitoneum was performed with a curvilinear transducer utilizing volumetric sweeps and still imaging techniques       FINDINGS:     KIDNEYS:     Right kidney:  14 1 x 5 0 cm  Normal echogenicity and contour  Cortical thickness:  Normal   No suspicious masses detected  No hydronephrosis  No shadowing calculi  No perinephric fluid collections      Left kidney:  13 2 x 6 3 cm  Normal echogenicity and contour  Cortical thickness:  Normal   No suspicious masses detected  2 3 x 1 9 x 2 4 cm cortical cyst in the mid left kidney  No hydronephrosis  No shadowing calculi  No perinephric fluid collections      URETERS:  Nonvisualized      BLADDER:   Suboptimal distention with prevoid volume of 160 mL  No evidence of bladder mass  Generalized bladder wall thickening, appearance probably exaggerated by suboptimal distention  Bilateral ureteral jets detected      Post void bladder residual of 9 mL      IMPRESSION:     1   2 4 cm left renal cyst   2   Otherwise normal renal sonogram   3   Post void bladder residual of 9 mL          Workstation performed: [id number]      Imaging     US kidney and bladder with pvr (Order #[id number]) on 3/19/2018 - Imaging Information       Post Void Residual Measurement:  After voiding to completion the patient was brought to the exam room and positioned supine    Residual urine volume was measured with an ultrasound at:    30 ml

## 2019-01-02 ENCOUNTER — OFFICE VISIT (OUTPATIENT)
Dept: UROLOGY | Facility: AMBULATORY SURGERY CENTER | Age: 59
End: 2019-01-02
Payer: COMMERCIAL

## 2019-01-02 VITALS
WEIGHT: 192 LBS | SYSTOLIC BLOOD PRESSURE: 128 MMHG | DIASTOLIC BLOOD PRESSURE: 76 MMHG | HEART RATE: 80 BPM | HEIGHT: 67 IN | BODY MASS INDEX: 30.13 KG/M2

## 2019-01-02 DIAGNOSIS — R35.1 NOCTURIA: ICD-10-CM

## 2019-01-02 DIAGNOSIS — R35.0 URINARY FREQUENCY: Primary | ICD-10-CM

## 2019-01-02 LAB — POST-VOID RESIDUAL VOLUME, ML POC: 30 ML

## 2019-01-02 PROCEDURE — 51798 US URINE CAPACITY MEASURE: CPT | Performed by: UROLOGY

## 2019-01-02 PROCEDURE — 99214 OFFICE O/P EST MOD 30 MIN: CPT | Performed by: UROLOGY

## 2019-01-02 RX ORDER — SALICYLIC ACID 2 %
CLEANSER (ML) TOPICAL
Refills: 0 | COMMUNITY
Start: 2018-10-16

## 2019-01-02 RX ORDER — AMOXICILLIN AND CLAVULANATE POTASSIUM 875; 125 MG/1; MG/1
TABLET, FILM COATED ORAL
Refills: 0 | COMMUNITY
Start: 2018-09-25

## 2019-01-02 RX ORDER — FLUTICASONE PROPIONATE 50 MCG
2 SPRAY, SUSPENSION (ML) NASAL DAILY
Refills: 1 | COMMUNITY
Start: 2018-10-16

## 2019-01-02 RX ORDER — PREDNISONE 20 MG/1
TABLET ORAL
Refills: 0 | COMMUNITY
Start: 2018-10-18

## 2019-01-02 RX ORDER — MECLIZINE HCL 12.5 MG/1
12.5 TABLET ORAL 2 TIMES DAILY
Refills: 0 | COMMUNITY
Start: 2018-10-18

## 2019-01-02 RX ORDER — AZELASTINE HYDROCHLORIDE 137 UG/1
SPRAY, METERED NASAL
Refills: 1 | COMMUNITY
Start: 2018-09-25

## 2019-01-02 RX ORDER — CEFUROXIME AXETIL 500 MG/1
TABLET ORAL
Refills: 0 | COMMUNITY
Start: 2018-10-16

## 2019-01-15 ENCOUNTER — EVALUATION (OUTPATIENT)
Dept: PHYSICAL THERAPY | Facility: CLINIC | Age: 59
End: 2019-01-15
Payer: COMMERCIAL

## 2019-01-15 DIAGNOSIS — R42 DIZZINESS: Primary | ICD-10-CM

## 2019-01-15 PROCEDURE — 97112 NEUROMUSCULAR REEDUCATION: CPT | Performed by: PHYSICAL THERAPIST

## 2019-01-15 PROCEDURE — 97162 PT EVAL MOD COMPLEX 30 MIN: CPT | Performed by: PHYSICAL THERAPIST

## 2019-01-15 NOTE — LETTER
2019    MD Renetta Noyola [address] [facility]    Patient: Irais Pineda   YOB: 1960   Date of Visit: 1/15/2019     Encounter Diagnosis     ICD-10-CM    1  Dilia Lemons        Dear Dr Tayo Car:    Please review the attached Plan of Care from [facility]'s recent visit  Please verify that you agree therapy should continue by signing the attached document and sending it back to our office  If you have any questions or concerns, please don't hesitate to call  Sincerely,    Yady Escalera PT      Referring Provider:      I certify that I have read the below Plan of Care and certify the need for these services furnished under this plan of treatment while under my care  MD Renetta Noyola [address]: [phone]          PT Evaluation     Today's date: 2019  Patient name: Irais Pineda  : 1960  MRN: [id number]  Referring provider: Zamzam Jacobo MD  Dx:   Encounter Diagnosis     ICD-10-CM    1  Dizziness R42                   Assessment  Assessment details: Pt presents to physical therapy with a diagnosis of vertigo  At this time all oculomotor and vestibular testing is within normal limits  POsitoinal testing for BPPV was negative  Pt had slight feeling of dizziness  It is likely that patient had BPPV and it self corrected  Pt was assigned habituation exercise to reduce momentary feeling of dizziness when changing positions  Pt will return for reassess in one week following HEP     Understanding of Dx/Px/POC: good   Prognosis: good    Goals  STG and LTG  PT will report no dizziness wihtin a week    Plan  Patient would benefit from: skilled physical therapy  Planned therapy interventions: neuromuscular re-education and home exercise program  Frequency: 1x week  Duration in weeks: 2  Plan of Care beginning date: 1/15/2019  Plan of Care expiration date: 3/29/2019  Treatment plan discussed with: patient        Subjective Evaluation    History of Present Illness  Mechanism of injury: Pt reports having dizziness and had to take a day off of work with a 3 day course of dizziness  One year ago he was given medication and exercise and it didn't happen again  He reports getting dizzy getting up out of bed about one week ago  Mostly all dizziness is with position changes  He reports meclazine has helped  He has not had imaging, consulted with ENT at this time  Recurrent probem    Pain  No pain reported    Social Support    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[employer])  Patient Goals  Patient goal: decreaes dizziness        Objective        Dysequilibrium: No  Lightheadedness: No  Vertigo: Yes  Rocking or Swaying: No         Oscillopsia: No  Diplopia: No  Motion sickness:NO  Floating, Swimming, Disconnected: Yes    Exacerbation Factors:  Bending over: Yes  Turning Head: No  Rolling in bed: Yes  Walking: No  Looking up: No  Supine to/from sitting: Yes  Optokinetic movement: No  Walking in busy environment: No    Duration of Symptoms:few seconds     Concurrent Complaints:  Tinnitus:Yes  Aural Fullness: Yes  Known hearing loss:No  Nausea, Vomiting: Yes  Altered Vision: No  Poor Concentration: No  Memory Loss: No  Peripheral Neuropathy:No  Cervical Pain: No   Headache: Yes      PHYSICAL FINDINGS:  Oculomotor ROM :WNL  Resting nystagmus: No  Gaze holding nystagmus Yes To right  Smooth pursuit Normal    Vertical Saccades:Normal  Horizontal Saccades:Normal  Convergence: Normal      Head thrust (room light): Normal    Dynamic Visual Acuity: adequate  2 lines difference      MCTSIB  30 eyes open firm surface   30 eyes closed form surface  30 eyes open foam surface  30 eyes closed foam surface      DHI: 22  0-30 mild , 30-60 moderate,  severe disability      Positional testing: Right Left   [place] WNL WNL   Roll test: WNl WNL       Cervical ROM:WNL  Flexion:  Extension:  Right rotation:  Left rotation:  Right lateral flexion:  Left lateral flexion:        Flowsheet Rows      Most Recent Value   PT/OT G-Codes   Current Score  97   Projected Score  100          Precautions: na    Daily Treatment Diary     Manual                                                                                   Exercise Diary                                                                                                                                                                                                                                                                                      Modalities

## 2019-01-22 ENCOUNTER — OFFICE VISIT (OUTPATIENT)
Dept: PHYSICAL THERAPY | Facility: CLINIC | Age: 59
End: 2019-01-22
Payer: COMMERCIAL

## 2019-01-22 DIAGNOSIS — R42 DIZZINESS: Primary | ICD-10-CM

## 2019-01-22 PROCEDURE — 97112 NEUROMUSCULAR REEDUCATION: CPT | Performed by: PHYSICAL THERAPIST

## 2019-01-22 NOTE — PROGRESS NOTES
Daily Note     Today's date: 2019  Patient name: Wyatt Nevarez  : 1960  MRN: [id number]  Referring provider: Kimberley Dumont MD  Dx: No diagnosis found  Subjective: Pt reports no episodes of diziness since last session      Objective: See treatment diary below      Assessment: Reviewed jayesh pichardo exercise, purpose of exercise as well as pathophysiology of BPPV  Pt will be place don a 30 day hold pending return of dizziness          Plan: Pt will be placed on 30 day hold

## 2019-07-16 ENCOUNTER — OFFICE VISIT (OUTPATIENT)
Dept: GASTROENTEROLOGY | Facility: AMBULARY SURGERY CENTER | Age: 59
End: 2019-07-16
Payer: COMMERCIAL

## 2019-07-16 VITALS
DIASTOLIC BLOOD PRESSURE: 80 MMHG | HEIGHT: 67 IN | SYSTOLIC BLOOD PRESSURE: 120 MMHG | RESPIRATION RATE: 16 BRPM | HEART RATE: 80 BPM | TEMPERATURE: 96.4 F | BODY MASS INDEX: 29.85 KG/M2 | WEIGHT: 190.2 LBS

## 2019-07-16 DIAGNOSIS — K64.8 BLEEDING INTERNAL HEMORRHOIDS: Primary | ICD-10-CM

## 2019-07-16 PROCEDURE — 99213 OFFICE O/P EST LOW 20 MIN: CPT | Performed by: PHYSICIAN ASSISTANT

## 2019-07-16 RX ORDER — HYDROCORTISONE ACETATE 25 MG/1
25 SUPPOSITORY RECTAL 2 TIMES DAILY PRN
Qty: 60 SUPPOSITORY | Refills: 5 | Status: SHIPPED | OUTPATIENT
Start: 2019-07-16 | End: 2019-07-25

## 2019-07-16 NOTE — PROGRESS NOTES
Assessment and Plan    #1  Intermittent rectal bleeding from hemorrhoids: occurs mainly with heavy lifting  This happening intermittently  Denies any alarm symptoms  Recent colonoscopy in 2017 showed small internal hemorrhoids  Otherwise was normal   No constipation  -discussed with patient about avoiding heavy lifting and increased intra-abdominal pressure  -continue with fiber in diet to avoid constipation  -discussed about using Anusol suppositories as needed with rectal bleeding  I advised that he can use these at the start of bleeding and continue them for several days until bleeding stops  -discussed alarm symptoms that would warrant EGD evaluation for anemia and blood loss  -if patient starts to notice heavier bleeding, more frequent bleeding, or any significant issues, he is to let us know and we can consider referral to Colorectal for hemorrhoid removal   Discussed risks and benefits of this with patient   -Due for repeat colonoscopy in 2023    --------------------------------------------------------------------------------------------------------------------    Chief Complaint: f/u rectal bleeding    HPI: Kalin Stewart is a 62 y o  male who presents today for follow up for rectal bleeding  The patient was last seen in 2017  He had a colonoscopy prior to this appointment which was normal aside from some small internal hemorrhoids  He reports he will have intermittent bleeding when he lifts heavy things  He denies any constipation  He denies any blood in the stool but reports total typically be surrounding the stool or with wiping  It is bright red  He denies any melena  He denies any other GI symptoms such as nausea, vomiting, abdominal pain, dysphagia, unexpected weight loss, diarrhea, constipation, or black tarry stools  He reports that he was using a cream given to him from his PCP a while back but is not using anything currently  Patient's last colonoscopy was 2017   He is due for repeat in 2023       Review of Systems:   General: negative for fatigue, fever, night sweats or unexpected weight loss  Psychological: negative for anxiety or depression  Ophthalmic: negative for blurry vision or scleral icterus  ENT: negative for headaches, oral lesions, sore throat, vocal changes or dysphagia  Hematological and Lymphatic: negative for pallor or swollen lymph nodes  Respiratory: negative for cough, shortness of breath or wheezing  Cardiovascular: negative for chest pain, edema or murmur  Gastrointestinal: as mentioned in HPI  Genito-Urinary: negative for dysuria or incontinence  Musculoskeletal: negative for joint pain, joint stiffness or joint swelling  Dermatological: negative for pruritus, rash, or jaundice    Current Medications  Current Outpatient Medications   Medication Sig Dispense Refill    multivitamin (THERAGRAN) TABS Take 1 tablet by mouth daily      Omega-3 Fatty Acids (FISH OIL CONCENTRATE PO) Take by mouth      rosuvastatin (CRESTOR) 10 MG tablet Take 10 mg by mouth daily      amoxicillin-clavulanate (AUGMENTIN) 875-125 mg per tablet TAKE 1 TABLET BY MOUTH TWICE A DAY UNTIL FINISHED  0    Azelastine HCl 137 MCG/SPRAY SOLN INSTILL 2 SPRAYS INTO EACH NOSTRIL TWICE A DAY  1    cefuroxime (CEFTIN) 500 mg tablet TAKE 1 TABLET BY MOUTH TWICE A DAY FOR 7 DAYS  0    CVS ALLERGY RELIEF D  MG per tablet TAKE 1 TABLET BY MOUTH IN THE MORNING FOR 10 DAYS  0    fluticasone (FLONASE) 50 mcg/act nasal spray 2 sprays daily  1    hydrocortisone (ANUSOL-HC) 25 mg suppository Insert 1 suppository (25 mg total) into the rectum 2 (two) times a day as needed for hemorrhoids 60 suppository 5    ibuprofen (MOTRIN) 600 mg tablet Take 1 tablet (600 mg total) by mouth every 8 (eight) hours for 10 days 30 tablet 0    meclizine (ANTIVERT) 12 5 MG tablet Take 12 5 mg by mouth 2 (two) times a day  0    naproxen (NAPROSYN) 500 mg tablet Take 1 tablet (500 mg total) by mouth 2 (two) times a day with meals (Patient not taking: Reported on 2019) 30 tablet 0    predniSONE 20 mg tablet TAKE 2 TABLETS EVERY DAY FOR 3 DAYS THEN TAKE 1 TABLET EVERY DAY FOR 4 DAYS  0    Saw Palmetto, Serenoa repens, 450 MG CAPS Take by mouth       No current facility-administered medications for this visit  Past Medical History  Past Medical History:   Diagnosis Date    BPH (benign prostatic hyperplasia)     GERD (gastroesophageal reflux disease)     Hyperlipidemia        Past Surgical History  Past Surgical History:   Procedure Laterality Date    COLONOSCOPY      ESOPHAGOGASTRODUODENOSCOPY      ME COLONOSCOPY FLX DX W/COLLJ SPEC WHEN PFRMD N/A 2017    Procedure: COLONOSCOPY;  Surgeon: Parish Kessler MD;  Location: [facility];   Service: Gastroenterology       Past Social History   Social History     Socioeconomic History    Marital status: /Civil Union     Spouse name: None    Number of children: None    Years of education: None    Highest education level: None   Occupational History    None   Social Needs    Financial resource strain: None    Food insecurity:     Worry: None     Inability: None    Transportation needs:     Medical: None     Non-medical: None   Tobacco Use    Smoking status: Former Smoker     Last attempt to quit: 2017     Years since quittin 0    Smokeless tobacco: Never Used   Substance and Sexual Activity    Alcohol use: Yes     Comment: social    Drug use: No    Sexual activity: None   Lifestyle    Physical activity:     Days per week: None     Minutes per session: None    Stress: None   Relationships    Social connections:     Talks on phone: None     Gets together: None     Attends [employer] service: None     Active member of club or organization: None     Attends meetings of clubs or organizations: None     Relationship status: None    Intimate partner violence:     Fear of current or ex partner: None     Emotionally abused: None     Physically abused: None Forced sexual activity: None   Other Topics Concern    None   Social History Narrative    None       The following portions of the patient's history were reviewed and updated as appropriate: allergies, current medications, past family history, past medical history, past social history, past surgical history and problem list     Vital Signs  Vitals:    07/16/19 0820   BP: 120/80   BP Location: Left arm   Patient Position: Sitting   Cuff Size: Standard   Pulse: 80   Resp: 16   Temp: (!) 96 4 °F (35 8 °C)   TempSrc: Tympanic   Weight: 86 3 kg (190 lb 3 2 oz)   Height: 5' 6 5" (1 689 m)       Physical Exam:  General appearance: alert, cooperative, no distress  HEENT: normocephalic, anicteric, no eye erythema or discharge, no oropharyngeal thrush  Neck: supple, trachea midline, no adenopathy  Lungs: CTA b/l, no rales, rhonchi, or wheezing, unlabored respirations  Heart: RRR, no murmur, rubs, or gallops  Abdomen: soft, non-tender, non-distended, normal bowel sounds, no masses or organomegaly  Rectal: deferred  Extremities: no cyanosis, clubbing, or edema  Musculoskeletal: normal gait  Skin: color and texture normal, no jaundice, no rashes or lesions  Psychiatric: alert and oriented, normal affect and behavior

## 2019-07-22 ENCOUNTER — TELEPHONE (OUTPATIENT)
Dept: GASTROENTEROLOGY | Facility: AMBULARY SURGERY CENTER | Age: 59
End: 2019-07-22

## 2019-07-22 NOTE — TELEPHONE ENCOUNTER
skye patient      The anusol is too expensive, over $300   He would like an alternate called in if possible      Call back # [phone]

## 2019-07-23 NOTE — TELEPHONE ENCOUNTER
Can we please find out if anusol rectal cream would be cheaper for the patient instead of the suppositories?  If the cream is also too expensive, then there arent any other prescription options and would recommend OCT preparation H cream or suppositories for hemorrhoids

## 2019-07-25 DIAGNOSIS — K64.8 BLEEDING INTERNAL HEMORRHOIDS: ICD-10-CM

## 2019-07-25 NOTE — TELEPHONE ENCOUNTER
Called insurance and spoke with a vito  If we send hydrocortisone cream with applicator 76UR 8 0% it is covered, pt will have to pay $5 for a 30day supply and $10 for a 90day through mail order    pls advise

## 2019-08-21 ENCOUNTER — APPOINTMENT (OUTPATIENT)
Dept: LAB | Facility: CLINIC | Age: 59
End: 2019-08-21
Payer: COMMERCIAL

## 2019-08-21 ENCOUNTER — TRANSCRIBE ORDERS (OUTPATIENT)
Dept: LAB | Facility: CLINIC | Age: 59
End: 2019-08-21

## 2019-08-21 DIAGNOSIS — R53.83 FATIGUE, UNSPECIFIED TYPE: Primary | ICD-10-CM

## 2019-08-21 DIAGNOSIS — E78.41 ELEVATED LIPOPROTEIN A LEVEL: ICD-10-CM

## 2019-08-21 DIAGNOSIS — R53.83 FATIGUE, UNSPECIFIED TYPE: ICD-10-CM

## 2019-08-21 LAB
ALBUMIN SERPL BCP-MCNC: 3.9 G/DL (ref 3.5–5)
ALP SERPL-CCNC: 100 U/L (ref 46–116)
ALT SERPL W P-5'-P-CCNC: 37 U/L (ref 12–78)
ANION GAP SERPL CALCULATED.3IONS-SCNC: 7 MMOL/L (ref 4–13)
AST SERPL W P-5'-P-CCNC: 26 U/L (ref 5–45)
BASOPHILS # BLD AUTO: 0.06 THOUSANDS/ΜL (ref 0–0.1)
BASOPHILS NFR BLD AUTO: 1 % (ref 0–1)
BILIRUB SERPL-MCNC: 0.7 MG/DL (ref 0.2–1)
BUN SERPL-MCNC: 11 MG/DL (ref 5–25)
CALCIUM SERPL-MCNC: 9.1 MG/DL (ref 8.3–10.1)
CHLORIDE SERPL-SCNC: 104 MMOL/L (ref 100–108)
CHOLEST SERPL-MCNC: 213 MG/DL (ref 50–200)
CO2 SERPL-SCNC: 28 MMOL/L (ref 21–32)
CREAT SERPL-MCNC: 0.89 MG/DL (ref 0.6–1.3)
EOSINOPHIL # BLD AUTO: 0.06 THOUSAND/ΜL (ref 0–0.61)
EOSINOPHIL NFR BLD AUTO: 1 % (ref 0–6)
ERYTHROCYTE [DISTWIDTH] IN BLOOD BY AUTOMATED COUNT: 15.4 % (ref 11.6–15.1)
GFR SERPL CREATININE-BSD FRML MDRD: 94 ML/MIN/1.73SQ M
GLUCOSE P FAST SERPL-MCNC: 94 MG/DL (ref 65–99)
HCT VFR BLD AUTO: 49.8 % (ref 36.5–49.3)
HDLC SERPL-MCNC: 34 MG/DL (ref 40–60)
HGB BLD-MCNC: 16.1 G/DL (ref 12–17)
IMM GRANULOCYTES # BLD AUTO: 0.02 THOUSAND/UL (ref 0–0.2)
IMM GRANULOCYTES NFR BLD AUTO: 0 % (ref 0–2)
LDLC SERPL CALC-MCNC: 134 MG/DL (ref 0–100)
LYMPHOCYTES # BLD AUTO: 1.67 THOUSANDS/ΜL (ref 0.6–4.47)
LYMPHOCYTES NFR BLD AUTO: 34 % (ref 14–44)
MCH RBC QN AUTO: 27.4 PG (ref 26.8–34.3)
MCHC RBC AUTO-ENTMCNC: 32.3 G/DL (ref 31.4–37.4)
MCV RBC AUTO: 85 FL (ref 82–98)
MONOCYTES # BLD AUTO: 0.42 THOUSAND/ΜL (ref 0.17–1.22)
MONOCYTES NFR BLD AUTO: 9 % (ref 4–12)
NEUTROPHILS # BLD AUTO: 2.71 THOUSANDS/ΜL (ref 1.85–7.62)
NEUTS SEG NFR BLD AUTO: 55 % (ref 43–75)
NONHDLC SERPL-MCNC: 179 MG/DL
NRBC BLD AUTO-RTO: 0 /100 WBCS
PLATELET # BLD AUTO: 190 THOUSANDS/UL (ref 149–390)
PMV BLD AUTO: 11.3 FL (ref 8.9–12.7)
POTASSIUM SERPL-SCNC: 4.5 MMOL/L (ref 3.5–5.3)
PROT SERPL-MCNC: 7.5 G/DL (ref 6.4–8.2)
RBC # BLD AUTO: 5.88 MILLION/UL (ref 3.88–5.62)
SODIUM SERPL-SCNC: 139 MMOL/L (ref 136–145)
T3 SERPL-MCNC: 1.1 NG/ML (ref 0.6–1.8)
T4 FREE SERPL-MCNC: 0.86 NG/DL (ref 0.76–1.46)
TRIGL SERPL-MCNC: 223 MG/DL
TSH SERPL DL<=0.05 MIU/L-ACNC: 1.34 UIU/ML (ref 0.36–3.74)
WBC # BLD AUTO: 4.94 THOUSAND/UL (ref 4.31–10.16)

## 2019-08-21 PROCEDURE — 80061 LIPID PANEL: CPT

## 2019-08-21 PROCEDURE — 36415 COLL VENOUS BLD VENIPUNCTURE: CPT

## 2019-08-21 PROCEDURE — 84439 ASSAY OF FREE THYROXINE: CPT

## 2019-08-21 PROCEDURE — 80053 COMPREHEN METABOLIC PANEL: CPT

## 2019-08-21 PROCEDURE — 84480 ASSAY TRIIODOTHYRONINE (T3): CPT

## 2019-08-21 PROCEDURE — 84443 ASSAY THYROID STIM HORMONE: CPT

## 2019-08-21 PROCEDURE — 85025 COMPLETE CBC W/AUTO DIFF WBC: CPT

## 2020-02-07 ENCOUNTER — TRANSCRIBE ORDERS (OUTPATIENT)
Dept: LAB | Facility: CLINIC | Age: 60
End: 2020-02-07

## 2020-02-07 ENCOUNTER — APPOINTMENT (OUTPATIENT)
Dept: LAB | Facility: CLINIC | Age: 60
End: 2020-02-07
Payer: COMMERCIAL

## 2020-02-07 DIAGNOSIS — R35.0 URINARY FREQUENCY: ICD-10-CM

## 2020-02-07 DIAGNOSIS — E78.41 ELEVATED LIPOPROTEIN A LEVEL: ICD-10-CM

## 2020-02-07 DIAGNOSIS — Z79.01 LONG TERM (CURRENT) USE OF ANTICOAGULANTS: ICD-10-CM

## 2020-02-07 DIAGNOSIS — Z12.5 SPECIAL SCREENING FOR MALIGNANT NEOPLASM OF PROSTATE: ICD-10-CM

## 2020-02-07 DIAGNOSIS — R53.83 OTHER FATIGUE: ICD-10-CM

## 2020-02-07 DIAGNOSIS — R53.83 OTHER FATIGUE: Primary | ICD-10-CM

## 2020-02-07 LAB
ALBUMIN SERPL BCP-MCNC: 4 G/DL (ref 3.5–5)
ALP SERPL-CCNC: 114 U/L (ref 46–116)
ALT SERPL W P-5'-P-CCNC: 35 U/L (ref 12–78)
ANION GAP SERPL CALCULATED.3IONS-SCNC: 7 MMOL/L (ref 4–13)
AST SERPL W P-5'-P-CCNC: 21 U/L (ref 5–45)
BASOPHILS # BLD AUTO: 0.05 THOUSANDS/ΜL (ref 0–0.1)
BASOPHILS NFR BLD AUTO: 1 % (ref 0–1)
BILIRUB SERPL-MCNC: 0.56 MG/DL (ref 0.2–1)
BUN SERPL-MCNC: 12 MG/DL (ref 5–25)
CALCIUM SERPL-MCNC: 9.2 MG/DL (ref 8.3–10.1)
CHLORIDE SERPL-SCNC: 107 MMOL/L (ref 100–108)
CHOLEST SERPL-MCNC: 198 MG/DL (ref 50–200)
CO2 SERPL-SCNC: 26 MMOL/L (ref 21–32)
CREAT SERPL-MCNC: 0.88 MG/DL (ref 0.6–1.3)
EOSINOPHIL # BLD AUTO: 0.06 THOUSAND/ΜL (ref 0–0.61)
EOSINOPHIL NFR BLD AUTO: 2 % (ref 0–6)
ERYTHROCYTE [DISTWIDTH] IN BLOOD BY AUTOMATED COUNT: 15.2 % (ref 11.6–15.1)
GFR SERPL CREATININE-BSD FRML MDRD: 94 ML/MIN/1.73SQ M
GLUCOSE P FAST SERPL-MCNC: 101 MG/DL (ref 65–99)
HCT VFR BLD AUTO: 50.4 % (ref 36.5–49.3)
HDLC SERPL-MCNC: 31 MG/DL
HGB BLD-MCNC: 16.3 G/DL (ref 12–17)
IMM GRANULOCYTES # BLD AUTO: 0.01 THOUSAND/UL (ref 0–0.2)
IMM GRANULOCYTES NFR BLD AUTO: 0 % (ref 0–2)
LDLC SERPL CALC-MCNC: 131 MG/DL (ref 0–100)
LYMPHOCYTES # BLD AUTO: 1.74 THOUSANDS/ΜL (ref 0.6–4.47)
LYMPHOCYTES NFR BLD AUTO: 44 % (ref 14–44)
MCH RBC QN AUTO: 27.5 PG (ref 26.8–34.3)
MCHC RBC AUTO-ENTMCNC: 32.3 G/DL (ref 31.4–37.4)
MCV RBC AUTO: 85 FL (ref 82–98)
MONOCYTES # BLD AUTO: 0.37 THOUSAND/ΜL (ref 0.17–1.22)
MONOCYTES NFR BLD AUTO: 10 % (ref 4–12)
NEUTROPHILS # BLD AUTO: 1.66 THOUSANDS/ΜL (ref 1.85–7.62)
NEUTS SEG NFR BLD AUTO: 43 % (ref 43–75)
NONHDLC SERPL-MCNC: 167 MG/DL
NRBC BLD AUTO-RTO: 0 /100 WBCS
PLATELET # BLD AUTO: 198 THOUSANDS/UL (ref 149–390)
PMV BLD AUTO: 12.1 FL (ref 8.9–12.7)
POTASSIUM SERPL-SCNC: 4.1 MMOL/L (ref 3.5–5.3)
PROT SERPL-MCNC: 7.5 G/DL (ref 6.4–8.2)
PSA SERPL-MCNC: 0.8 NG/ML (ref 0–4)
PSA SERPL-MCNC: 0.8 NG/ML (ref 0–4)
RBC # BLD AUTO: 5.92 MILLION/UL (ref 3.88–5.62)
SODIUM SERPL-SCNC: 140 MMOL/L (ref 136–145)
T3 SERPL-MCNC: 1.3 NG/ML (ref 0.6–1.8)
TRIGL SERPL-MCNC: 181 MG/DL
TSH SERPL DL<=0.05 MIU/L-ACNC: 2.05 UIU/ML (ref 0.36–3.74)
WBC # BLD AUTO: 3.89 THOUSAND/UL (ref 4.31–10.16)

## 2020-02-07 PROCEDURE — G0103 PSA SCREENING: HCPCS

## 2020-02-07 PROCEDURE — 36415 COLL VENOUS BLD VENIPUNCTURE: CPT

## 2020-02-07 PROCEDURE — 80061 LIPID PANEL: CPT

## 2020-02-07 PROCEDURE — 80053 COMPREHEN METABOLIC PANEL: CPT

## 2020-02-07 PROCEDURE — 84480 ASSAY TRIIODOTHYRONINE (T3): CPT

## 2020-02-07 PROCEDURE — 85025 COMPLETE CBC W/AUTO DIFF WBC: CPT

## 2020-02-07 PROCEDURE — 84443 ASSAY THYROID STIM HORMONE: CPT

## 2020-06-25 ENCOUNTER — TRANSCRIBE ORDERS (OUTPATIENT)
Dept: LAB | Facility: CLINIC | Age: 60
End: 2020-06-25

## 2020-06-25 ENCOUNTER — APPOINTMENT (OUTPATIENT)
Dept: LAB | Facility: CLINIC | Age: 60
End: 2020-06-25
Payer: COMMERCIAL

## 2020-06-25 DIAGNOSIS — E78.41 ELEVATED LIPOPROTEIN A LEVEL: ICD-10-CM

## 2020-06-25 DIAGNOSIS — R35.0 URINARY FREQUENCY: ICD-10-CM

## 2020-06-25 DIAGNOSIS — E78.41 ELEVATED LIPOPROTEIN A LEVEL: Primary | ICD-10-CM

## 2020-06-25 DIAGNOSIS — R53.83 OTHER FATIGUE: ICD-10-CM

## 2020-06-25 LAB
ALBUMIN SERPL BCP-MCNC: 4 G/DL (ref 3.5–5)
ALP SERPL-CCNC: 111 U/L (ref 46–116)
ALT SERPL W P-5'-P-CCNC: 41 U/L (ref 12–78)
ANION GAP SERPL CALCULATED.3IONS-SCNC: 6 MMOL/L (ref 4–13)
AST SERPL W P-5'-P-CCNC: 21 U/L (ref 5–45)
BACTERIA UR QL AUTO: ABNORMAL /HPF
BASOPHILS # BLD AUTO: 0.05 THOUSANDS/ΜL (ref 0–0.1)
BASOPHILS NFR BLD AUTO: 1 % (ref 0–1)
BILIRUB SERPL-MCNC: 0.49 MG/DL (ref 0.2–1)
BILIRUB UR QL STRIP: NEGATIVE
BUN SERPL-MCNC: 12 MG/DL (ref 5–25)
CALCIUM SERPL-MCNC: 8.7 MG/DL (ref 8.3–10.1)
CHLORIDE SERPL-SCNC: 108 MMOL/L (ref 100–108)
CHOLEST SERPL-MCNC: 195 MG/DL (ref 50–200)
CLARITY UR: CLEAR
CO2 SERPL-SCNC: 25 MMOL/L (ref 21–32)
COLOR UR: YELLOW
CREAT SERPL-MCNC: 0.9 MG/DL (ref 0.6–1.3)
EOSINOPHIL # BLD AUTO: 0.07 THOUSAND/ΜL (ref 0–0.61)
EOSINOPHIL NFR BLD AUTO: 2 % (ref 0–6)
ERYTHROCYTE [DISTWIDTH] IN BLOOD BY AUTOMATED COUNT: 15.3 % (ref 11.6–15.1)
ERYTHROCYTE [SEDIMENTATION RATE] IN BLOOD: 9 MM/HOUR (ref 0–10)
GFR SERPL CREATININE-BSD FRML MDRD: 93 ML/MIN/1.73SQ M
GLUCOSE P FAST SERPL-MCNC: 94 MG/DL (ref 65–99)
GLUCOSE UR STRIP-MCNC: NEGATIVE MG/DL
HCT VFR BLD AUTO: 51.1 % (ref 36.5–49.3)
HDLC SERPL-MCNC: 32 MG/DL
HGB BLD-MCNC: 16.2 G/DL (ref 12–17)
HGB UR QL STRIP.AUTO: ABNORMAL
HYALINE CASTS #/AREA URNS LPF: ABNORMAL /LPF
IMM GRANULOCYTES # BLD AUTO: 0.01 THOUSAND/UL (ref 0–0.2)
IMM GRANULOCYTES NFR BLD AUTO: 0 % (ref 0–2)
KETONES UR STRIP-MCNC: NEGATIVE MG/DL
LDLC SERPL CALC-MCNC: 124 MG/DL (ref 0–100)
LEUKOCYTE ESTERASE UR QL STRIP: NEGATIVE
LYMPHOCYTES # BLD AUTO: 1.81 THOUSANDS/ΜL (ref 0.6–4.47)
LYMPHOCYTES NFR BLD AUTO: 42 % (ref 14–44)
MCH RBC QN AUTO: 27.8 PG (ref 26.8–34.3)
MCHC RBC AUTO-ENTMCNC: 31.7 G/DL (ref 31.4–37.4)
MCV RBC AUTO: 88 FL (ref 82–98)
MONOCYTES # BLD AUTO: 0.36 THOUSAND/ΜL (ref 0.17–1.22)
MONOCYTES NFR BLD AUTO: 8 % (ref 4–12)
NEUTROPHILS # BLD AUTO: 1.98 THOUSANDS/ΜL (ref 1.85–7.62)
NEUTS SEG NFR BLD AUTO: 47 % (ref 43–75)
NITRITE UR QL STRIP: NEGATIVE
NON-SQ EPI CELLS URNS QL MICRO: ABNORMAL /HPF
NONHDLC SERPL-MCNC: 163 MG/DL
NRBC BLD AUTO-RTO: 0 /100 WBCS
PH UR STRIP.AUTO: 5.5 [PH]
PLATELET # BLD AUTO: 185 THOUSANDS/UL (ref 149–390)
PMV BLD AUTO: 12.3 FL (ref 8.9–12.7)
POTASSIUM SERPL-SCNC: 4.2 MMOL/L (ref 3.5–5.3)
PROT SERPL-MCNC: 7.4 G/DL (ref 6.4–8.2)
PROT UR STRIP-MCNC: NEGATIVE MG/DL
PSA SERPL-MCNC: 0.8 NG/ML (ref 0–4)
RBC # BLD AUTO: 5.83 MILLION/UL (ref 3.88–5.62)
RBC #/AREA URNS AUTO: ABNORMAL /HPF
SODIUM SERPL-SCNC: 139 MMOL/L (ref 136–145)
SP GR UR STRIP.AUTO: 1.02 (ref 1–1.03)
T3 SERPL-MCNC: 1 NG/ML (ref 0.6–1.8)
TRIGL SERPL-MCNC: 195 MG/DL
TSH SERPL DL<=0.05 MIU/L-ACNC: 1.31 UIU/ML (ref 0.36–3.74)
UROBILINOGEN UR QL STRIP.AUTO: 0.2 E.U./DL
WBC # BLD AUTO: 4.28 THOUSAND/UL (ref 4.31–10.16)
WBC #/AREA URNS AUTO: ABNORMAL /HPF

## 2020-06-25 PROCEDURE — 36415 COLL VENOUS BLD VENIPUNCTURE: CPT

## 2020-06-25 PROCEDURE — 81001 URINALYSIS AUTO W/SCOPE: CPT

## 2020-06-25 PROCEDURE — 85652 RBC SED RATE AUTOMATED: CPT

## 2020-06-25 PROCEDURE — 84480 ASSAY TRIIODOTHYRONINE (T3): CPT

## 2020-06-25 PROCEDURE — 85025 COMPLETE CBC W/AUTO DIFF WBC: CPT

## 2020-06-25 PROCEDURE — 80053 COMPREHEN METABOLIC PANEL: CPT

## 2020-06-25 PROCEDURE — 84443 ASSAY THYROID STIM HORMONE: CPT

## 2020-06-25 PROCEDURE — 84153 ASSAY OF PSA TOTAL: CPT

## 2020-06-25 PROCEDURE — 80061 LIPID PANEL: CPT

## 2021-01-10 ENCOUNTER — IMMUNIZATIONS (OUTPATIENT)
Dept: FAMILY MEDICINE CLINIC | Facility: HOSPITAL | Age: 61
End: 2021-01-10

## 2021-01-10 DIAGNOSIS — Z23 ENCOUNTER FOR IMMUNIZATION: ICD-10-CM

## 2021-01-10 PROCEDURE — 0001A SARS-COV-2 / COVID-19 MRNA VACCINE (PFIZER-BIONTECH) 30 MCG: CPT

## 2021-01-10 PROCEDURE — 91300 SARS-COV-2 / COVID-19 MRNA VACCINE (PFIZER-BIONTECH) 30 MCG: CPT

## 2021-01-30 ENCOUNTER — IMMUNIZATIONS (OUTPATIENT)
Dept: FAMILY MEDICINE CLINIC | Facility: HOSPITAL | Age: 61
End: 2021-01-30

## 2021-01-30 DIAGNOSIS — Z23 ENCOUNTER FOR IMMUNIZATION: Primary | ICD-10-CM

## 2021-01-30 PROCEDURE — 91300 SARS-COV-2 / COVID-19 MRNA VACCINE (PFIZER-BIONTECH) 30 MCG: CPT

## 2021-01-30 PROCEDURE — 0002A SARS-COV-2 / COVID-19 MRNA VACCINE (PFIZER-BIONTECH) 30 MCG: CPT

## 2021-05-18 ENCOUNTER — APPOINTMENT (OUTPATIENT)
Dept: RADIOLOGY | Age: 61
End: 2021-05-18
Attending: NURSE PRACTITIONER
Payer: COMMERCIAL

## 2021-05-18 ENCOUNTER — OFFICE VISIT (OUTPATIENT)
Dept: URGENT CARE | Age: 61
End: 2021-05-18
Payer: COMMERCIAL

## 2021-05-18 VITALS
DIASTOLIC BLOOD PRESSURE: 81 MMHG | SYSTOLIC BLOOD PRESSURE: 136 MMHG | OXYGEN SATURATION: 98 % | HEART RATE: 81 BPM | RESPIRATION RATE: 16 BRPM | TEMPERATURE: 97.5 F

## 2021-05-18 DIAGNOSIS — R10.9 RIGHT FLANK PAIN: ICD-10-CM

## 2021-05-18 DIAGNOSIS — T14.8XXA MUSCLE STRAIN: Primary | ICD-10-CM

## 2021-05-18 DIAGNOSIS — M25.559 HIP PAIN: ICD-10-CM

## 2021-05-18 LAB
SL AMB  POCT GLUCOSE, UA: NEGATIVE
SL AMB LEUKOCYTE ESTERASE,UA: NEGATIVE
SL AMB POCT BILIRUBIN,UA: NEGATIVE
SL AMB POCT BLOOD,UA: NEGATIVE
SL AMB POCT CLARITY,UA: CLEAR
SL AMB POCT COLOR,UA: YELLOW
SL AMB POCT KETONES,UA: NEGATIVE
SL AMB POCT NITRITE,UA: NEGATIVE
SL AMB POCT PH,UA: 6
SL AMB POCT SPECIFIC GRAVITY,UA: 1.02
SL AMB POCT URINE PROTEIN: NEGATIVE
SL AMB POCT UROBILINOGEN: 0.2

## 2021-05-18 PROCEDURE — 73502 X-RAY EXAM HIP UNI 2-3 VIEWS: CPT

## 2021-05-18 PROCEDURE — S9083 URGENT CARE CENTER GLOBAL: HCPCS | Performed by: NURSE PRACTITIONER

## 2021-05-18 PROCEDURE — 81002 URINALYSIS NONAUTO W/O SCOPE: CPT | Performed by: NURSE PRACTITIONER

## 2021-05-18 PROCEDURE — G0384 LEV 5 HOSP TYPE B ED VISIT: HCPCS | Performed by: NURSE PRACTITIONER

## 2021-05-18 RX ORDER — NAPROXEN 500 MG/1
500 TABLET ORAL 2 TIMES DAILY WITH MEALS
Qty: 30 TABLET | Refills: 0 | Status: SHIPPED | OUTPATIENT
Start: 2021-05-18

## 2021-05-18 RX ORDER — METHOCARBAMOL 500 MG/1
500 TABLET, FILM COATED ORAL 3 TIMES DAILY
Qty: 30 TABLET | Refills: 0 | Status: SHIPPED | OUTPATIENT
Start: 2021-05-18

## 2021-05-18 NOTE — PROGRESS NOTES
[facility]        NAME: Aletha Tolentino is a 61 y o  male  : 1960    MRN: [id number]  DATE: May 18, 2021  TIME: 4:11 PM    Assessment and Plan   Muscle strain [T14  8XXA]  1  Muscle strain  naproxen (NAPROSYN) 500 mg tablet    methocarbamol (ROBAXIN) 500 mg tablet   2  Right flank pain  POCT urine dip    naproxen (NAPROSYN) 500 mg tablet    methocarbamol (ROBAXIN) 500 mg tablet   3  Hip pain  XR hip/pelv 2-3 vws right if performed    naproxen (NAPROSYN) 500 mg tablet    methocarbamol (ROBAXIN) 500 mg tablet         Patient Instructions       Follow up with PCP in 3-5 days  Proceed to  ER if symptoms worsen  Your xray was preliminarily read by your provider  A radiologist will read the xray and if it is abnormal, you will be notified  You are to take the naproxen for pain as directed and take the robaxin as needed for muscle spasms  You are to follow up with your PCP  Go to the ED if symptoms worsen        Chief Complaint     Chief Complaint   Patient presents with    Hip Pain     right hip pain x 2 weeks, denies injury, with use of tylenol and heating pad last night, and stretching and gym  History of Present Illness       This is a 61year old male who states that 2 weeks ago he was doing some twisting at the gym and developed right flank pain and the pain is now in the right groin  He states that he thinks he pulled a muscle  He states that he took ibuprofen last night w/o any relief  He denies seeing his PCP or taking anything for the pain other than last night  He states he has been working and hasn't had time for work up  He denies hx of kidney stones or blood in urine  He state pain is worse with sitting  He thinks he pulled a muscle  Hip Pain         Review of Systems   Review of Systems   Constitutional: Negative  HENT: Negative  Eyes: Negative  Respiratory: Negative  Cardiovascular: Negative  Gastrointestinal: Negative  Endocrine: Negative  Genitourinary: Positive for flank pain  Urgency: right groin pain    Skin: Negative  Allergic/Immunologic: Negative  Neurological: Negative  Hematological: Negative  Psychiatric/Behavioral: Negative            Current Medications       Current Outpatient Medications:     rosuvastatin (CRESTOR) 10 MG tablet, Take 10 mg by mouth daily, Disp: , Rfl:     amoxicillin-clavulanate (AUGMENTIN) 875-125 mg per tablet, TAKE 1 TABLET BY MOUTH TWICE A DAY UNTIL FINISHED, Disp: , Rfl: 0    Azelastine HCl 137 MCG/SPRAY SOLN, INSTILL 2 SPRAYS INTO EACH NOSTRIL TWICE A DAY, Disp: , Rfl: 1    cefuroxime (CEFTIN) 500 mg tablet, TAKE 1 TABLET BY MOUTH TWICE A DAY FOR 7 DAYS, Disp: , Rfl: 0    [employer] ALLERGY RELIEF D  MG per tablet, TAKE 1 TABLET BY MOUTH IN THE MORNING FOR 10 DAYS, Disp: , Rfl: 0    fluticasone (FLONASE) 50 mcg/act nasal spray, 2 sprays daily, Disp: , Rfl: 1    hydrocortisone (ANUSOL-HC, PROCTOSOL HC,) 2 5 % rectal cream, Insert into the rectum 2 (two) times a day as needed for hemorrhoids (Patient not taking: Reported on 5/18/2021), Disp: 30 g, Rfl: 5    ibuprofen (MOTRIN) 600 mg tablet, Take 1 tablet (600 mg total) by mouth every 8 (eight) hours for 10 days, Disp: 30 tablet, Rfl: 0    meclizine (ANTIVERT) 12 5 MG tablet, Take 12 5 mg by mouth 2 (two) times a day, Disp: , Rfl: 0    methocarbamol (ROBAXIN) 500 mg tablet, Take 1 tablet (500 mg total) by mouth 3 (three) times a day, Disp: 30 tablet, Rfl: 0    multivitamin (THERAGRAN) TABS, Take 1 tablet by mouth daily, Disp: , Rfl:     naproxen (NAPROSYN) 500 mg tablet, Take 1 tablet (500 mg total) by mouth 2 (two) times a day with meals (Patient not taking: Reported on 7/16/2019), Disp: 30 tablet, Rfl: 0    naproxen (NAPROSYN) 500 mg tablet, Take 1 tablet (500 mg total) by mouth 2 (two) times a day with meals, Disp: 30 tablet, Rfl: 0    Omega-3 Fatty Acids (FISH OIL CONCENTRATE PO), Take by mouth, Disp: , Rfl:     predniSONE 20 mg tablet, TAKE 2 TABLETS EVERY DAY FOR 3 DAYS THEN TAKE 1 TABLET EVERY DAY FOR 4 DAYS, Disp: , Rfl: 0    Saw Palmetto, Serenoa repens, 450 MG CAPS, Take by mouth, Disp: , Rfl:     Current Allergies     Allergies as of 05/18/2021    (No Known Allergies)            The following portions of the patient's history were reviewed and updated as appropriate: allergies, current medications, past family history, past medical history, past social history, past surgical history and problem list      Past Medical History:   Diagnosis Date    BPH (benign prostatic hyperplasia)     GERD (gastroesophageal reflux disease)     Hyperlipidemia        Past Surgical History:   Procedure Laterality Date    COLONOSCOPY      ESOPHAGOGASTRODUODENOSCOPY      HI COLONOSCOPY FLX DX W/COLLJ SPEC WHEN PFRMD N/A 2/13/2017    Procedure: COLONOSCOPY;  Surgeon: Amira Canales MD;  Location: [facility]; Service: Gastroenterology       Family History   Problem Relation Age of Onset   Eris De Guzman Breast cancer Mother     Diabetes Mother     Hypertension Mother     Lung cancer Mother          Medications have been verified  Objective   /81   Pulse 81   Temp 97 5 °F (36 4 °C)   Resp 16   SpO2 98%   No LMP for male patient  Physical Exam     Physical Exam  Vitals signs and nursing note reviewed  Constitutional:       General: He is not in acute distress  Appearance: Normal appearance  He is obese  He is not ill-appearing, toxic-appearing or diaphoretic  HENT:      Head: Normocephalic and atraumatic  Eyes:      Extraocular Movements: Extraocular movements intact  Pupils: Pupils are equal, round, and reactive to light  Neck:      Musculoskeletal: Normal range of motion  Cardiovascular:      Rate and Rhythm: Normal rate  Pulses: Normal pulses  Heart sounds: Normal heart sounds  Pulmonary:      Effort: Pulmonary effort is normal       Breath sounds: Normal breath sounds     Abdominal:      General: There is no distension  Palpations: Abdomen is soft  Tenderness: There is no abdominal tenderness  Musculoskeletal: Normal range of motion  Skin:     General: Skin is warm and dry  Capillary Refill: Capillary refill takes less than 2 seconds  Neurological:      General: No focal deficit present  Mental Status: He is alert and oriented to person, place, and time  Psychiatric:         Mood and Affect: Mood normal          Behavior: Behavior normal          Thought Content: Thought content normal          Judgment: Judgment normal              Urine negative for blood (concern for kidney stone)     Right hip and pelvis xray ordered  Preliminary reading of hip and pelvis xray negative for acute process  Waiting on rad read     Pain could be related to muscle strain/pull    Will give NSAIDS and muscle relaxer and have pt follow up with PCP

## 2021-05-18 NOTE — PATIENT INSTRUCTIONS
Your xray was preliminarily read by your provider  A radiologist will read the xray and if it is abnormal, you will be notified  You are to take the naproxen for pain as directed and take the robaxin as needed for muscle spasms  No driving machinery or drinking alcohol with robaxin  No gym until better  You are to follow up with your PCP  Go to the ED if symptoms worsen    Muscle Strain   WHAT YOU NEED TO KNOW:   A muscle strain is a twist, pull, or tear of a muscle or tendon  A tendon is a strong elastic tissue that connects a muscle to a bone  Signs of a strained muscle include bruising and swelling over the area, pain with movement, and loss of strength  DISCHARGE INSTRUCTIONS:   Return to the emergency department if:   · You suddenly cannot feel or move your injured muscle  Contact your healthcare provider if:   · Your pain and swelling worsen or do not go away  · You have questions or concerns about your condition or care  Medicines:   · NSAIDs  help decrease swelling and pain or fever  This medicine is available with or without a doctor's order  NSAIDs can cause stomach bleeding or kidney problems in certain people  If you take blood thinner medicine, always ask your healthcare provider if NSAIDs are safe for you  Always read the medicine label and follow directions  · Muscle relaxers  help decrease pain and muscle spasms  · Take your medicine as directed  Contact your healthcare provider if you think your medicine is not helping or if you have side effects  Tell him of her if you are allergic to any medicine  Keep a list of the medicines, vitamins, and herbs you take  Include the amounts, and when and why you take them  Bring the list or the pill bottles to follow-up visits  Carry your medicine list with you in case of an emergency  Follow up with your healthcare provider as directed:   Your healthcare provider may suggest that you have a follow-up visit before you go back to your usual activity  Write down your questions so you remember to ask them during your visits  Self-care:   · 3 to 7 days after the injury:  Use Rest, Ice, Compression, and Elevation (RICE) to help stop bruising and decrease pain and swelling  ? Rest:  Rest your muscle to allow your injury to heal  When the pain decreases, begin normal, slow movements  For mild and moderate muscle strains, you should rest your muscles for about 2 days  However, if you have a severe muscle strain, you should rest for 10 to 14 days  You may need to use crutches to walk if your muscle strain is in your legs or lower body  ? Ice:  Put an ice pack on the injured area  Put a towel between the ice pack and your skin  Do not put the ice pack directly on your skin  You can use a package of frozen peas instead of an ice pack  ? Compression:  You may need to wrap an elastic bandage around the area to decrease swelling  It should be tight enough for you to feel support  Do not wrap it too tightly  ? Elevation:  Keep the injured muscle raised above your heart if possible  For example if you have a strain of your lower leg muscle, lie down and prop your leg up on pillows  This helps decrease pain and swelling  · 3 to 21 days after the injury:  Start to slowly and regularly exercise your muscle  This will help it heal  If you feel pain, decrease how hard you are exercising  · 1 to 6 weeks after the injury:  Stretch the injured muscle  Hold the stretch for about 30 seconds  Do this 4 times a day  You may stretch the muscle until you feel a slight pull  Stop stretching if you feel pain  · 2 weeks to 6 months after the injury:  The goal of this phase is to return to the activity you were doing before the injury happened, without hurting the muscle again  · 3 weeks to 6 months after the injury:  Keep stretching and strengthening your muscles to avoid injury  Slowly increase the time and distance that you exercise   You may have signs and symptoms of muscle strain 6 months after the injury, even if you do things to help it heal  In this case, you may need surgery on the muscle  © Copyright [address] Information is for End User's use only and may not be sold, redistributed or otherwise used for commercial purposes  All illustrations and images included in CareNotes® are the copyrighted property of A D A M , Inc  or [facility] Kit Cornelius   The above information is an  only  It is not intended as medical advice for individual conditions or treatments  Talk to your doctor, nurse or pharmacist before following any medical regimen to see if it is safe and effective for you

## 2021-07-19 ENCOUNTER — APPOINTMENT (OUTPATIENT)
Dept: LAB | Facility: CLINIC | Age: 61
End: 2021-07-19
Payer: COMMERCIAL

## 2021-07-19 DIAGNOSIS — R35.0 URINARY FREQUENCY: ICD-10-CM

## 2021-07-19 DIAGNOSIS — Z12.5 SCREENING FOR PROSTATE CANCER: ICD-10-CM

## 2021-07-19 DIAGNOSIS — E78.5 HYPERLIPIDEMIA, UNSPECIFIED HYPERLIPIDEMIA TYPE: ICD-10-CM

## 2021-07-19 DIAGNOSIS — R53.83 FATIGUE, UNSPECIFIED TYPE: ICD-10-CM

## 2021-07-19 LAB
ALBUMIN SERPL BCP-MCNC: 3.7 G/DL (ref 3.5–5)
ALP SERPL-CCNC: 111 U/L (ref 46–116)
ALT SERPL W P-5'-P-CCNC: 32 U/L (ref 12–78)
ANION GAP SERPL CALCULATED.3IONS-SCNC: 1 MMOL/L (ref 4–13)
AST SERPL W P-5'-P-CCNC: 21 U/L (ref 5–45)
BASOPHILS # BLD AUTO: 0.05 THOUSANDS/ΜL (ref 0–0.1)
BASOPHILS NFR BLD AUTO: 1 % (ref 0–1)
BILIRUB SERPL-MCNC: 0.52 MG/DL (ref 0.2–1)
BUN SERPL-MCNC: 9 MG/DL (ref 5–25)
CALCIUM SERPL-MCNC: 9.3 MG/DL (ref 8.3–10.1)
CHLORIDE SERPL-SCNC: 109 MMOL/L (ref 100–108)
CHOLEST SERPL-MCNC: 199 MG/DL (ref 50–200)
CO2 SERPL-SCNC: 28 MMOL/L (ref 21–32)
CREAT SERPL-MCNC: 0.99 MG/DL (ref 0.6–1.3)
EOSINOPHIL # BLD AUTO: 0.06 THOUSAND/ΜL (ref 0–0.61)
EOSINOPHIL NFR BLD AUTO: 1 % (ref 0–6)
ERYTHROCYTE [DISTWIDTH] IN BLOOD BY AUTOMATED COUNT: 15 % (ref 11.6–15.1)
GFR SERPL CREATININE-BSD FRML MDRD: 82 ML/MIN/1.73SQ M
GLUCOSE P FAST SERPL-MCNC: 95 MG/DL (ref 65–99)
HCT VFR BLD AUTO: 49.1 % (ref 36.5–49.3)
HDLC SERPL-MCNC: 34 MG/DL
HGB BLD-MCNC: 15.5 G/DL (ref 12–17)
IMM GRANULOCYTES # BLD AUTO: 0.01 THOUSAND/UL (ref 0–0.2)
IMM GRANULOCYTES NFR BLD AUTO: 0 % (ref 0–2)
LDLC SERPL CALC-MCNC: 136 MG/DL (ref 0–100)
LYMPHOCYTES # BLD AUTO: 1.51 THOUSANDS/ΜL (ref 0.6–4.47)
LYMPHOCYTES NFR BLD AUTO: 36 % (ref 14–44)
MCH RBC QN AUTO: 27.3 PG (ref 26.8–34.3)
MCHC RBC AUTO-ENTMCNC: 31.6 G/DL (ref 31.4–37.4)
MCV RBC AUTO: 87 FL (ref 82–98)
MONOCYTES # BLD AUTO: 0.41 THOUSAND/ΜL (ref 0.17–1.22)
MONOCYTES NFR BLD AUTO: 10 % (ref 4–12)
NEUTROPHILS # BLD AUTO: 2.12 THOUSANDS/ΜL (ref 1.85–7.62)
NEUTS SEG NFR BLD AUTO: 52 % (ref 43–75)
NONHDLC SERPL-MCNC: 165 MG/DL
NRBC BLD AUTO-RTO: 0 /100 WBCS
PLATELET # BLD AUTO: 184 THOUSANDS/UL (ref 149–390)
PMV BLD AUTO: 12.4 FL (ref 8.9–12.7)
POTASSIUM SERPL-SCNC: 4.3 MMOL/L (ref 3.5–5.3)
PROT SERPL-MCNC: 7.4 G/DL (ref 6.4–8.2)
PSA SERPL-MCNC: 1.1 NG/ML (ref 0–4)
PSA SERPL-MCNC: 1.1 NG/ML (ref 0–4)
RBC # BLD AUTO: 5.67 MILLION/UL (ref 3.88–5.62)
SODIUM SERPL-SCNC: 138 MMOL/L (ref 136–145)
T3 SERPL-MCNC: 1.2 NG/ML (ref 0.6–1.8)
T4 FREE SERPL-MCNC: 0.93 NG/DL (ref 0.76–1.46)
TRIGL SERPL-MCNC: 145 MG/DL
TSH SERPL DL<=0.05 MIU/L-ACNC: 1.52 UIU/ML (ref 0.36–3.74)
WBC # BLD AUTO: 4.16 THOUSAND/UL (ref 4.31–10.16)

## 2021-07-19 PROCEDURE — G0103 PSA SCREENING: HCPCS

## 2021-07-19 PROCEDURE — 84443 ASSAY THYROID STIM HORMONE: CPT

## 2021-07-19 PROCEDURE — 84439 ASSAY OF FREE THYROXINE: CPT

## 2021-07-19 PROCEDURE — 80061 LIPID PANEL: CPT

## 2021-07-19 PROCEDURE — 80053 COMPREHEN METABOLIC PANEL: CPT

## 2021-07-19 PROCEDURE — 36415 COLL VENOUS BLD VENIPUNCTURE: CPT

## 2021-07-19 PROCEDURE — 85025 COMPLETE CBC W/AUTO DIFF WBC: CPT

## 2021-07-19 PROCEDURE — 84480 ASSAY TRIIODOTHYRONINE (T3): CPT

## 2021-07-20 ENCOUNTER — EVALUATION (OUTPATIENT)
Dept: PHYSICAL THERAPY | Facility: CLINIC | Age: 61
End: 2021-07-20
Payer: COMMERCIAL

## 2021-07-20 DIAGNOSIS — M54.16 LUMBAR RADICULOPATHY: Primary | ICD-10-CM

## 2021-07-20 PROCEDURE — 97110 THERAPEUTIC EXERCISES: CPT | Performed by: PHYSICAL THERAPIST

## 2021-07-20 PROCEDURE — 97161 PT EVAL LOW COMPLEX 20 MIN: CPT | Performed by: PHYSICAL THERAPIST

## 2021-07-20 NOTE — PROGRESS NOTES
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  He had a negative x-ray and he did try medications s much relief  He is still working full time  He does have trouble sleeping and he is sleeping on the floor which feels better  Neuro signs: numbness and tingling to anterio-lateral knee  Bowel and bladder sxs: none  Red flags: none  Occupation: [address] [employer]- [employer], [employer]        Pain  At best pain ratin  At worst pain ratin  Location: R lateral hip to anterior groin to anterior knee  Quality: sharp, tingling annoying  Relieving factors: ER stretch, taking pressure off  Aggravating factors: pressure, driving, sitting too long, lifting    Social Support  Lives at home with his wife      Patient Goals  Patient goals for therapy: To get better with sitting and driving  STGs  1  Decrease pain by 20% in 2-4 weeks to improve standing tolerance  2  Improve seated ROM s pain in 2-4 weeks to improve sit to stand  3  Improve hip strength by 1/3 grade in 2-4 weeks to improve lifting performance to #20  LTGs  1  Decrease pain by 60% in 6-8 weeks  2  Improve walking tolerance to >30 minutes in 6-8 weeks to perform community ambulation  3  Perform job/dressing/showering activities independently without pain in 6-8 weeks  4  Improve lifting tolerance to #40 in 8 weeks         Objective Measurements:      Balance: SLS WFL    Squat: good form and depth     Sensation:-  Myotomes:-      Slump:- SLR: Ally Corina:-  Faddir: -     Sitting at 90 hip pain down his anterior thigh  KTC: felt tightness in knee   EIL improved seated lumbar flexion and knee to chest  6 mins on treadmill made it worse  Lumbar ROM L R Strength knee L R   Flex   5 reps pain no worse Flex 5 5   Ext   10 reps numbness in knee and pain Ext 5 5   Rot WFL WFL      SB        Hip A/PROM WFL       Flex  /  100p/ Flex 4+ 4   ER at 90   ER     IR at 90   IR     Abd   Abd     Ext   Ext             Knee A/PROM   Ankle     Flex   DF 5 5   Ext   PF           Assessment:    Ave Estes is a pleasant 61 y o  male who presents with referring diagnosis of lumbar radiculopathy  The patient's greatest concern is improving pain for work  No further referral appears necessary at this time based upon examination results  The primary movement impairment diagnosis is lumbar extension derangement resulting in pathoanatomical symptoms of lumbar radiculopathy and limiting his ability to sit for prolonged times and perform job/lifting activities s pain  Impairments include:  1) Decreased lumbar ROM  2) Decreased hip strength      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  Negative prognostic indicators:none  Positive prognostic indicators: good relief c lumbar ext and prior PT  Please contact me if you have any further questions or recommendations  Thank you very much for the kind referral         Plan  Patient would benefit from:Skilled physical therapy  Planned therapy interventions: manual therapy, neuromuscular re-education, stretching, strengthening, therapeutic activities, therapeutic exercise, patient education, home exercise program, and activity modification  Frequency: 2x week  Duration in weeks: 8  Treatment plan discussed with: patient             Goals: Patient's goal is To get better with sitting and driving       Precautions: none  Dx:R lumbar radiculopathy ext derangement- numbness and tingling to anterio-lateral knee      Daily Treatment Diary     Manuals 7/20/2021        Prone pa panchito gr 3-4 lumbar                                    Ther Ex         Prone press ups 3x10        Standing ext- when rudi         treadmill 6'        Assess SG                                                      Neuro Re-ed         TA contraction                                             Ther Activity         minisquat         Lifting box         stairs         Gait Training                           Modalities

## 2021-07-20 NOTE — LETTER
2021    MD Renetta Solitario [address] [facility]    Patient: Edith Avery   YOB: 1960   Date of Visit: 2021     Encounter Diagnosis     ICD-10-CM    1  Lumbar radiculopathy  M54 16        Dear Dr Philip Petties: Thank you for your recent referral of Edith Avery  Please review the attached evaluation summary from [address] [place] [address] recent visit  Please verify that you agree with the plan of care by signing the attached order  If you have any questions or concerns, please do not hesitate to call  I sincerely appreciate the opportunity to share in the care of one of your patients and hope to have another opportunity to work with you in the near future  Sincerely,    Mayra Knight, PT      Referring Provider:      I certify that I have read the below Plan of Care and certify the need for these services furnished under this plan of treatment while under my care  MD Renetta Solitario [address] [place] [address]  Via Fax: [phone]          PT Evaluation    Today's date: 2021   Patient name: Edith Avery  : 1960  MRN: [id number]  Referring provider: Taras Oppenheim, PT  Dx:   Encounter Diagnosis     ICD-10-CM    1  Lumbar radiculopathy  M54 16            Subjective Evaluation     History of Present Illness    Patient presents with c/o R lateral hip pain to anterior groin and thigh  He thinks it is from 3 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  He had a negative x-ray and he did try medications s much relief  He is still working full time  He does have trouble sleeping and he is sleeping on the floor which feels better       Neuro signs: numbness and tingling to anterio-lateral knee  Bowel and bladder sxs: none  Red flags: none  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, [employer]        Pain  At best pain ratin  At worst pain ratin  Location: R lateral hip to anterior groin to anterior knee  Quality: sharp, tingling annoying  Relieving factors: ER stretch, taking pressure off  Aggravating factors: pressure, driving, sitting too long, lifting    Social Support  Lives at home with his wife      Patient Goals  Patient goals for therapy: To get better with sitting and driving  STGs  1  Decrease pain by 20% in 2-4 weeks to improve standing tolerance  2  Improve seated ROM s pain in 2-4 weeks to improve sit to stand  3  Improve hip strength by 1/3 grade in 2-4 weeks to improve lifting performance to #20  LTGs  1  Decrease pain by 60% in 6-8 weeks  2  Improve walking tolerance to >30 minutes in 6-8 weeks to perform community ambulation  3  Perform job/dressing/showering activities independently without pain in 6-8 weeks  4  Improve lifting tolerance to #40 in 8 weeks  Objective Measurements:      Balance: SLS WFL    Squat: good form and depth     Sensation:-  Myotomes:-      Slump:- SLR: [employer] Jed:-  Faddir: -     Sitting at 90 hip pain down his anterior thigh  KTC: felt tightness in knee   EIL improved seated lumbar flexion and knee to chest  6 mins on treadmill made it worse  Lumbar ROM L R Strength knee L R   Flex   5 reps pain no worse Flex 5 5   Ext   10 reps numbness in knee and pain Ext 5 5   Rot WFL WFL      SB        Hip A/PROM WFL       Flex  /  100p/ Flex 4+ 4   ER at 90   ER     IR at 90   IR     Abd   Abd     Ext   Ext             Knee A/PROM   Ankle     Flex   DF 5 5   Ext   PF           Assessment:    Joanna Castro is a pleasant 61 y o  male who presents with referring diagnosis of lumbar radiculopathy  The patient's greatest concern is improving pain for work  No further referral appears necessary at this time based upon examination results      The primary movement impairment diagnosis is lumbar extension derangement resulting in pathoanatomical symptoms of lumbar radiculopathy and limiting his ability to sit for prolonged times and perform job/lifting activities s pain     Impairments include:  1) Decreased lumbar ROM  2) Decreased hip strength      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  Negative prognostic indicators:none  Positive prognostic indicators: good relief c lumbar ext and prior PT  Please contact me if you have any further questions or recommendations  Thank you very much for the kind referral         Plan  Patient would benefit from:Skilled physical therapy  Planned therapy interventions: manual therapy, neuromuscular re-education, stretching, strengthening, therapeutic activities, therapeutic exercise, patient education, home exercise program, and activity modification  Frequency: 2x week  Duration in weeks: 8  Treatment plan discussed with: patient             Goals: Patient's goal is To get better with sitting and driving       Precautions: none  Dx:R lumbar radiculopathy ext derangement- numbness and tingling to anterio-lateral knee      Daily Treatment Diary     Manuals 7/20/2021        Prone pa glides gr 3-4 lumbar                                    Ther Ex         Prone press ups 3x10        Standing ext- when rudi         treadmill 6'        Assess SG                                                      Neuro Re-ed         TA contraction                                             Ther Activity         minisquat         Lifting box         stairs         Gait Training                           Modalities

## 2021-07-22 ENCOUNTER — OFFICE VISIT (OUTPATIENT)
Dept: PHYSICAL THERAPY | Facility: CLINIC | Age: 61
End: 2021-07-22
Payer: COMMERCIAL

## 2021-07-22 DIAGNOSIS — M54.16 LUMBAR RADICULOPATHY: Primary | ICD-10-CM

## 2021-07-22 PROCEDURE — 97110 THERAPEUTIC EXERCISES: CPT | Performed by: PHYSICAL THERAPIST

## 2021-07-22 PROCEDURE — 97140 MANUAL THERAPY 1/> REGIONS: CPT | Performed by: PHYSICAL THERAPIST

## 2021-07-22 NOTE — PROGRESS NOTES
Daily Note     Today's date: 2021  Patient name: Irena Shone  : 1960  MRN: [id number]  Referring provider: Dong Mccoy, PT  Dx:   Encounter Diagnosis     ICD-10-CM    1  Lumbar radiculopathy  M54 16                   Subjective: he states he is feeling a little better with sitting  Objective: See treatment diary below      Assessment: Tolerated treatment well  He did have the anterior knee pain c lumbar ext initially  He was able to progress c lumbar ext in standing after manuals s thigh pain  Patient would benefit from continued PT  Plan: Continue per plan of care  Goals: Patient's goal is To get better with sitting and driving       Precautions: none  Dx:R lumbar radiculopathy ext derangement- numbness and tingling to anterio-lateral knee      Daily Treatment Diary     Manuals 2021       Prone pa glides gr 3-4 lumbar  10'                                  Ther Ex         Prone press ups 3x10 3x10       Standing ext- when rudi  2x10       treadmill 6' 6'       Assess R SG  10x2                                                    Neuro Re-ed         TA contraction  10x                                           Ther Activity         minisquat         Lifting box         stairs         Gait Training                           Modalities

## 2021-07-26 ENCOUNTER — APPOINTMENT (OUTPATIENT)
Dept: PHYSICAL THERAPY | Facility: CLINIC | Age: 61
End: 2021-07-26
Payer: COMMERCIAL

## 2021-07-26 NOTE — PROGRESS NOTES
Discharge Note  Pao Samson has made good functional progress with physical therapy  he was educated and updated in his home exercise program  Yessy Dominguez will be discharged from formal therapy due to feeling better but will follow up as needed

## 2021-07-28 ENCOUNTER — APPOINTMENT (OUTPATIENT)
Dept: PHYSICAL THERAPY | Facility: CLINIC | Age: 61
End: 2021-07-28
Payer: COMMERCIAL

## 2021-09-17 ENCOUNTER — OFFICE VISIT (OUTPATIENT)
Dept: UROLOGY | Facility: AMBULATORY SURGERY CENTER | Age: 61
End: 2021-09-17
Payer: COMMERCIAL

## 2021-09-17 VITALS
HEART RATE: 70 BPM | BODY MASS INDEX: 30.7 KG/M2 | HEIGHT: 66 IN | SYSTOLIC BLOOD PRESSURE: 122 MMHG | DIASTOLIC BLOOD PRESSURE: 78 MMHG

## 2021-09-17 DIAGNOSIS — Z12.5 SCREENING FOR PROSTATE CANCER: Primary | ICD-10-CM

## 2021-09-17 DIAGNOSIS — Z80.42 FAMILY HISTORY OF PROSTATE CANCER: ICD-10-CM

## 2021-09-17 PROCEDURE — 99213 OFFICE O/P EST LOW 20 MIN: CPT | Performed by: NURSE PRACTITIONER

## 2021-09-17 NOTE — PROGRESS NOTES
9/17/2021    Lamont Plate  1960  [id number]      Assessment  -Prostate cancer screening    Discussion/Plan  Gris Serra is a 64 y o  male being managed by office    1  Prostate cancer screening-   We reviewed the results of his recent PSA which is 1 1  No significant findings noted on digital rectal examination today  PVR assessment is 21 mL  He reports some mild urinary frequency  We discussed avoiding bladder irritants and increasing water intake  We will continue annual prostate cancer screening due to his family history  Follow-up in 1 year with PSA and RISSA  He was instructed to call sooner with any issues     -All questions answered, patient agrees with plan      History of Present Illness  64 y o  male with a history of prostate cancer screening presents today for follow up  Patient last seen in the office in January 2019  He reports some mild urinary frequency, but feels this is related to coffee and alcoholic beverage consumption  He denies any gross hematuria or dysuria  Patient takes saw palmetto daily  He believes his uncle had a history of prostate cancer  His last PSA in 2020 was 0 8  No additional changes to his overall health  Review of Systems  Review of Systems   Constitutional: Negative  HENT: Negative  Respiratory: Negative  Cardiovascular: Negative  Gastrointestinal: Negative  Genitourinary: Positive for frequency  Negative for decreased urine volume, difficulty urinating, dysuria, flank pain, hematuria and urgency  Musculoskeletal: Negative  Skin: Negative  Neurological: Negative  Psychiatric/Behavioral: Negative  AUA SYMPTOM SCORE      Most Recent Value   AUA SYMPTOM SCORE   How often have you had a sensation of not emptying your bladder completely after you finished urinating? 2   How often have you had to urinate again less than two hours after you finished urinating?   1   How often have you found you stopped and started again several times when you urinate? 2   How often have you found it difficult to postpone urination? 0   How often have you had a weak urinary stream?  2   How often have you had to push or strain to begin urination? 0   How many times did you most typically get up to urinate from the time you went to bed at night until the time you got up in the morning? 2   Quality of Life: If you were to spend the rest of your life with your urinary condition just the way it is now, how would you feel about that?  2   AUA SYMPTOM SCORE  9          Past Medical History  Past Medical History:   Diagnosis Date    BPH (benign prostatic hyperplasia)     GERD (gastroesophageal reflux disease)     Hyperlipidemia        Past Social History  Past Surgical History:   Procedure Laterality Date    COLONOSCOPY      ESOPHAGOGASTRODUODENOSCOPY      CO COLONOSCOPY FLX DX W/COLLJ SPEC WHEN PFRMD N/A 2017    Procedure: COLONOSCOPY;  Surgeon: Parish Kessler MD;  Location: [facility];   Service: Gastroenterology       Past Family History  Family History   Problem Relation Age of Onset   [place] Breast cancer Mother     Diabetes Mother     Hypertension Mother     Lung cancer Mother        Past Social history  Social History     Socioeconomic History    Marital status: /Civil Union     Spouse name: Not on file    Number of children: Not on file    Years of education: Not on file    Highest education level: Not on file   Occupational History    Not on file   Tobacco Use    Smoking status: Former Smoker     Quit date: 2017     Years since quittin 2    Smokeless tobacco: Never Used   Substance and Sexual Activity    Alcohol use: Yes     Comment: social    Drug use: No    Sexual activity: Not on file   Other Topics Concern    Not on file   Social History Narrative    Not on file     Social Determinants of Health     Financial Resource Strain:     Difficulty of Paying Living Expenses:    Food Insecurity:     Worried About Running Out of Food in the Last Year:    [address] in the Last Year:    Transportation Needs:     Lack of Transportation (Medical):      Lack of Transportation (Non-Medical):    Physical Activity:     Days of Exercise per Week:     Minutes of Exercise per Session:    Stress:     Feeling of Stress :    Social Connections:     Frequency of Communication with Friends and Family:     Frequency of Social Gatherings with Friends and Family:     Attends Confucianist Services:     Active Member of Clubs or Organizations:     Attends Club or Organization Meetings:     Marital Status:    Intimate Partner Violence:     Fear of Current or Ex-Partner:     Emotionally Abused:     Physically Abused:     Sexually Abused:        Current Medications  Current Outpatient Medications   Medication Sig Dispense Refill    meclizine (ANTIVERT) 12 5 MG tablet Take 12 5 mg by mouth 2 (two) times a day  0    Saw Palmetto, Serenoa repens, 450 MG CAPS Take by mouth      amoxicillin-clavulanate (AUGMENTIN) 875-125 mg per tablet TAKE 1 TABLET BY MOUTH TWICE A DAY UNTIL FINISHED (Patient not taking: Reported on 9/17/2021)  0    Azelastine HCl 137 MCG/SPRAY SOLN INSTILL 2 SPRAYS INTO EACH NOSTRIL TWICE A DAY (Patient not taking: Reported on 9/17/2021)  1    cefuroxime (CEFTIN) 500 mg tablet TAKE 1 TABLET BY MOUTH TWICE A DAY FOR 7 DAYS (Patient not taking: Reported on 9/17/2021)  0    [facility] ALLERGY RELIEF D  MG per tablet TAKE 1 TABLET BY MOUTH IN THE MORNING FOR 10 DAYS (Patient not taking: Reported on 9/17/2021)  0    fluticasone (FLONASE) 50 mcg/act nasal spray 2 sprays daily (Patient not taking: Reported on 9/17/2021)  1    hydrocortisone (ANUSOL-HC, PROCTOSOL HC,) 2 5 % rectal cream Insert into the rectum 2 (two) times a day as needed for hemorrhoids (Patient not taking: Reported on 5/18/2021) 30 g 5    ibuprofen (MOTRIN) 600 mg tablet Take 1 tablet (600 mg total) by mouth every 8 (eight) hours for 10 days 30 tablet 0  methocarbamol (ROBAXIN) 500 mg tablet Take 1 tablet (500 mg total) by mouth 3 (three) times a day (Patient not taking: Reported on 9/17/2021) 30 tablet 0    multivitamin (THERAGRAN) TABS Take 1 tablet by mouth daily (Patient not taking: Reported on 9/17/2021)      naproxen (NAPROSYN) 500 mg tablet Take 1 tablet (500 mg total) by mouth 2 (two) times a day with meals (Patient not taking: Reported on 7/16/2019) 30 tablet 0    naproxen (NAPROSYN) 500 mg tablet Take 1 tablet (500 mg total) by mouth 2 (two) times a day with meals (Patient not taking: Reported on 9/17/2021) 30 tablet 0    Omega-3 Fatty Acids (FISH OIL CONCENTRATE PO) Take by mouth (Patient not taking: Reported on 9/17/2021)      predniSONE 20 mg tablet TAKE 2 TABLETS EVERY DAY FOR 3 DAYS THEN TAKE 1 TABLET EVERY DAY FOR 4 DAYS (Patient not taking: Reported on 9/17/2021)  0    rosuvastatin (CRESTOR) 10 MG tablet Take 10 mg by mouth daily (Patient not taking: Reported on 9/17/2021)       No current facility-administered medications for this visit  Allergies  No Known Allergies    Past Medical History, Social History, Family History, medications and allergies were reviewed  Vitals  Vitals:    09/17/21 0733   BP: 122/78   BP Location: Right arm   Patient Position: Sitting   Cuff Size: Adult   Pulse: 70   Height: 5' 6" (1 676 m)       Physical Exam  Physical Exam  Constitutional:       Appearance: Normal appearance  He is well-developed  HENT:      Head: Normocephalic  Eyes:      Pupils: Pupils are equal, round, and reactive to light  Pulmonary:      Effort: Pulmonary effort is normal    Abdominal:      Palpations: Abdomen is soft  Genitourinary:     Prostate: Normal       Rectum: Normal       Comments: Prostate 35 g, smooth, nontender, no nodules  Musculoskeletal:         General: Normal range of motion  Cervical back: Normal range of motion  Skin:     General: Skin is warm and dry     Neurological:      General: No focal deficit present  Mental Status: He is alert and oriented to person, place, and time  Psychiatric:         Mood and Affect: Mood normal          Behavior: Behavior normal          Thought Content: Thought content normal          Judgment: Judgment normal          Results    I have personally reviewed all pertinent lab results and reviewed with patient  Lab Results   Component Value Date    PSA 1 1 07/19/2021    PSA 1 1 07/19/2021    PSA 0 8 06/25/2020     Lab Results   Component Value Date    CALCIUM 9 3 07/19/2021    K 4 3 07/19/2021    CO2 28 07/19/2021     (H) 07/19/2021    BUN 9 07/19/2021    CREATININE 0 99 07/19/2021     Lab Results   Component Value Date    WBC 4 16 (L) 07/19/2021    HGB 15 5 07/19/2021    HCT 49 1 07/19/2021    MCV 87 07/19/2021     07/19/2021     No results found for this or any previous visit (from the past 1 hour(s))

## 2021-10-20 ENCOUNTER — NURSE TRIAGE (OUTPATIENT)
Dept: OTHER | Facility: OTHER | Age: 61
End: 2021-10-20

## 2021-10-20 DIAGNOSIS — R35.0 URINARY FREQUENCY: Primary | ICD-10-CM

## 2021-10-21 ENCOUNTER — APPOINTMENT (OUTPATIENT)
Dept: LAB | Facility: CLINIC | Age: 61
End: 2021-10-21
Payer: COMMERCIAL

## 2021-10-21 DIAGNOSIS — R35.0 URINARY FREQUENCY: ICD-10-CM

## 2021-10-21 LAB
BACTERIA UR QL AUTO: NORMAL /HPF
BILIRUB UR QL STRIP: NEGATIVE
CLARITY UR: CLEAR
COLOR UR: YELLOW
GLUCOSE UR STRIP-MCNC: NEGATIVE MG/DL
HGB UR QL STRIP.AUTO: NEGATIVE
HYALINE CASTS #/AREA URNS LPF: NORMAL /LPF
KETONES UR STRIP-MCNC: NEGATIVE MG/DL
LEUKOCYTE ESTERASE UR QL STRIP: NEGATIVE
NITRITE UR QL STRIP: NEGATIVE
NON-SQ EPI CELLS URNS QL MICRO: NORMAL /HPF
PH UR STRIP.AUTO: 6 [PH]
PROT UR STRIP-MCNC: NEGATIVE MG/DL
RBC #/AREA URNS AUTO: NORMAL /HPF
SP GR UR STRIP.AUTO: 1.02 (ref 1–1.03)
UROBILINOGEN UR QL STRIP.AUTO: 0.2 E.U./DL
WBC #/AREA URNS AUTO: NORMAL /HPF

## 2021-10-21 PROCEDURE — 87086 URINE CULTURE/COLONY COUNT: CPT

## 2021-10-21 PROCEDURE — 81001 URINALYSIS AUTO W/SCOPE: CPT

## 2021-10-22 LAB — BACTERIA UR CULT: NORMAL

## 2021-10-25 ENCOUNTER — TELEPHONE (OUTPATIENT)
Dept: OTHER | Facility: OTHER | Age: 61
End: 2021-10-25

## 2022-08-09 ENCOUNTER — TELEPHONE (OUTPATIENT)
Dept: OTHER | Facility: OTHER | Age: 62
End: 2022-08-09

## 2022-08-10 NOTE — TELEPHONE ENCOUNTER
I called the patient back, he inquired when he was seen last? As per charted on 9- by Gibson Brown: Follow-up in 1 year with PSA  and RISSA  He was instructed to call sooner with any issues  Pt will call back when he receives the card in the mail

## 2022-09-27 ENCOUNTER — TELEPHONE (OUTPATIENT)
Dept: OTHER | Facility: OTHER | Age: 62
End: 2022-09-27

## 2022-09-27 NOTE — TELEPHONE ENCOUNTER
Pt's wife called in stating pt needs to schedule his annual visit with his urologist and would like to see Dr Mayra Anderson

## 2022-10-05 ENCOUNTER — APPOINTMENT (OUTPATIENT)
Dept: LAB | Facility: CLINIC | Age: 62
End: 2022-10-05
Payer: COMMERCIAL

## 2022-10-05 DIAGNOSIS — R53.83 OTHER FATIGUE: ICD-10-CM

## 2022-10-05 DIAGNOSIS — R35.0 URINARY FREQUENCY: ICD-10-CM

## 2022-10-05 DIAGNOSIS — E78.5 HYPERLIPIDEMIA, UNSPECIFIED HYPERLIPIDEMIA TYPE: ICD-10-CM

## 2022-10-05 LAB
ALBUMIN SERPL BCP-MCNC: 4.2 G/DL (ref 3.5–5)
ALP SERPL-CCNC: 92 U/L (ref 34–104)
ALT SERPL W P-5'-P-CCNC: 20 U/L (ref 7–52)
ANION GAP SERPL CALCULATED.3IONS-SCNC: 3 MMOL/L (ref 4–13)
AST SERPL W P-5'-P-CCNC: 22 U/L (ref 13–39)
BASOPHILS # BLD AUTO: 0.05 THOUSANDS/ΜL (ref 0–0.1)
BASOPHILS NFR BLD AUTO: 2 % (ref 0–1)
BILIRUB SERPL-MCNC: 0.65 MG/DL (ref 0.2–1)
BUN SERPL-MCNC: 9 MG/DL (ref 5–25)
CALCIUM SERPL-MCNC: 9.3 MG/DL (ref 8.4–10.2)
CHLORIDE SERPL-SCNC: 106 MMOL/L (ref 96–108)
CHOLEST SERPL-MCNC: 194 MG/DL
CO2 SERPL-SCNC: 28 MMOL/L (ref 21–32)
CREAT SERPL-MCNC: 0.91 MG/DL (ref 0.6–1.3)
EOSINOPHIL # BLD AUTO: 0.07 THOUSAND/ΜL (ref 0–0.61)
EOSINOPHIL NFR BLD AUTO: 2 % (ref 0–6)
ERYTHROCYTE [DISTWIDTH] IN BLOOD BY AUTOMATED COUNT: 15.1 % (ref 11.6–15.1)
GFR SERPL CREATININE-BSD FRML MDRD: 90 ML/MIN/1.73SQ M
GLUCOSE P FAST SERPL-MCNC: 96 MG/DL (ref 65–99)
HCT VFR BLD AUTO: 53 % (ref 36.5–49.3)
HDLC SERPL-MCNC: 31 MG/DL
HGB BLD-MCNC: 16.1 G/DL (ref 12–17)
IMM GRANULOCYTES # BLD AUTO: 0.01 THOUSAND/UL (ref 0–0.2)
IMM GRANULOCYTES NFR BLD AUTO: 0 % (ref 0–2)
LDLC SERPL CALC-MCNC: 115 MG/DL (ref 0–100)
LYMPHOCYTES # BLD AUTO: 1.67 THOUSANDS/ΜL (ref 0.6–4.47)
LYMPHOCYTES NFR BLD AUTO: 50 % (ref 14–44)
MCH RBC QN AUTO: 27.6 PG (ref 26.8–34.3)
MCHC RBC AUTO-ENTMCNC: 30.4 G/DL (ref 31.4–37.4)
MCV RBC AUTO: 91 FL (ref 82–98)
MONOCYTES # BLD AUTO: 0.25 THOUSAND/ΜL (ref 0.17–1.22)
MONOCYTES NFR BLD AUTO: 8 % (ref 4–12)
NEUTROPHILS # BLD AUTO: 1.23 THOUSANDS/ΜL (ref 1.85–7.62)
NEUTS SEG NFR BLD AUTO: 38 % (ref 43–75)
NONHDLC SERPL-MCNC: 163 MG/DL
NRBC BLD AUTO-RTO: 0 /100 WBCS
PLATELET # BLD AUTO: 193 THOUSANDS/UL (ref 149–390)
PMV BLD AUTO: 11.1 FL (ref 8.9–12.7)
POTASSIUM SERPL-SCNC: 4.1 MMOL/L (ref 3.5–5.3)
PROT SERPL-MCNC: 6.9 G/DL (ref 6.4–8.4)
PSA SERPL-MCNC: 1.1 NG/ML (ref 0–4)
RBC # BLD AUTO: 5.83 MILLION/UL (ref 3.88–5.62)
SODIUM SERPL-SCNC: 137 MMOL/L (ref 135–147)
T3FREE SERPL-MCNC: 2.98 PG/ML (ref 2.3–4.2)
T4 FREE SERPL-MCNC: 0.81 NG/DL (ref 0.76–1.46)
TRIGL SERPL-MCNC: 239 MG/DL
TSH SERPL DL<=0.05 MIU/L-ACNC: 2.42 UIU/ML (ref 0.45–4.5)
WBC # BLD AUTO: 3.28 THOUSAND/UL (ref 4.31–10.16)

## 2022-10-05 PROCEDURE — 84443 ASSAY THYROID STIM HORMONE: CPT

## 2022-10-05 PROCEDURE — 36415 COLL VENOUS BLD VENIPUNCTURE: CPT

## 2022-10-05 PROCEDURE — 80061 LIPID PANEL: CPT

## 2022-10-05 PROCEDURE — 85025 COMPLETE CBC W/AUTO DIFF WBC: CPT

## 2022-10-05 PROCEDURE — 84481 FREE ASSAY (FT-3): CPT

## 2022-10-05 PROCEDURE — 84439 ASSAY OF FREE THYROXINE: CPT

## 2022-10-05 PROCEDURE — 84153 ASSAY OF PSA TOTAL: CPT

## 2022-10-05 PROCEDURE — 80053 COMPREHEN METABOLIC PANEL: CPT

## 2022-10-28 ENCOUNTER — TELEPHONE (OUTPATIENT)
Dept: UROLOGY | Facility: AMBULATORY SURGERY CENTER | Age: 62
End: 2022-10-28

## 2022-10-28 NOTE — TELEPHONE ENCOUNTER
Pt called and stated he needs to keep 11/4/22 appt 10/31/22 does not work    Pt call [phone]

## 2022-12-30 ENCOUNTER — OFFICE VISIT (OUTPATIENT)
Dept: UROLOGY | Facility: AMBULATORY SURGERY CENTER | Age: 62
End: 2022-12-30

## 2022-12-30 VITALS
HEART RATE: 93 BPM | WEIGHT: 183 LBS | BODY MASS INDEX: 29.54 KG/M2 | OXYGEN SATURATION: 94 % | DIASTOLIC BLOOD PRESSURE: 92 MMHG | SYSTOLIC BLOOD PRESSURE: 148 MMHG

## 2022-12-30 DIAGNOSIS — R35.1 BENIGN PROSTATIC HYPERPLASIA WITH NOCTURIA: Primary | ICD-10-CM

## 2022-12-30 DIAGNOSIS — N40.1 BENIGN PROSTATIC HYPERPLASIA WITH NOCTURIA: Primary | ICD-10-CM

## 2022-12-30 RX ORDER — ALFUZOSIN HYDROCHLORIDE 10 MG/1
10 TABLET, EXTENDED RELEASE ORAL DAILY
Qty: 90 TABLET | Refills: 3 | Status: SHIPPED | OUTPATIENT
Start: 2022-12-30

## 2022-12-30 RX ORDER — OMEPRAZOLE 20 MG/1
20 CAPSULE, DELAYED RELEASE ORAL EVERY OTHER DAY
COMMUNITY
Start: 2022-11-20

## 2022-12-30 NOTE — PROGRESS NOTES
12/30/2022    Amadou Cardenas  1960  [id number]        Assessment  Routine prostate cancer screening, history of left renal cyst, BPH      Discussion  I provide the patient with reassurance that his PSA is 1 1  Based on AUA guidelines he can return in follow-up every other year with PSA and digital rectal examination  With regards to his lower urinary tract symptoms I prescribed Uroxatrol 10 mg daily to take at bedtime  Follow-up in 8 weeks with postvoid residual assessment and uroflow evaluation  History of Present Illness  58 y o  male with a history of a request for routine prostate cancer screening  His most recent PSA level is 1 1 from October 2022  He complains of lower urinary tract symptoms  He has nocturia x4-6 episodes per night  He has occasional daytime urgency and frequency  He consumes a fair amount of coffee  He denies family history of prostate cancer  His wife is present with him in the office today  AUA Symptom Score  AUA SYMPTOM SCORE    Flowsheet Row Most Recent Value   AUA SYMPTOM SCORE    How often have you had a sensation of not emptying your bladder completely after you finished urinating? 3   How often have you had to urinate again less than two hours after you finished urinating? 4   How often have you found you stopped and started again several times when you urinate? 4   How often have you found it difficult to postpone urination? 2   How often have you had a weak urinary stream? 3   How often have you had to push or strain to begin urination? 1   How many times did you most typically get up to urinate from the time you went to bed at night until the time you got up in the morning? 3   Quality of Life: If you were to spend the rest of your life with your urinary condition just the way it is now, how would you feel about that? 3   AUA SYMPTOM SCORE 20          Review of Systems  Review of Systems   Constitutional: Negative  HENT: Negative  Eyes: Negative  Respiratory: Negative  Cardiovascular: Negative  Gastrointestinal: Negative  Endocrine: Negative  Genitourinary:        Per HPI   Musculoskeletal: Negative  Skin: Negative  Allergic/Immunologic: Negative  Neurological: Negative  Hematological: Negative  Psychiatric/Behavioral: Negative  Past Medical History  Past Medical History:   Diagnosis Date   • BPH (benign prostatic hyperplasia)    • GERD (gastroesophageal reflux disease)    • Hyperlipidemia        Past Social History  Past Surgical History:   Procedure Laterality Date   • COLONOSCOPY     • ESOPHAGOGASTRODUODENOSCOPY     • RI COLONOSCOPY FLX DX W/COLLJ SPEC WHEN PFRMD N/A 2017    Procedure: COLONOSCOPY;  Surgeon: Aisha Ludwig MD;  Location: [facility];   Service: Gastroenterology       Past Family History  Family History   Problem Relation Age of Onset   • Breast cancer Mother    • Diabetes Mother    • Hypertension Mother    • Lung cancer Mother        Past Social history  Social History     Socioeconomic History   • Marital status: /Civil Union     Spouse name: Not on file   • Number of children: Not on file   • Years of education: Not on file   • Highest education level: Not on file   Occupational History   • Not on file   Tobacco Use   • Smoking status: Former     Packs/day: 0 25     Types: Cigarettes     Quit date: 2017     Years since quittin 5     Passive exposure: Past   • Smokeless tobacco: Never   Vaping Use   • Vaping Use: Never used   Substance and Sexual Activity   • Alcohol use: Yes     Comment: social   • Drug use: No   • Sexual activity: Yes     Partners: Female   Other Topics Concern   • Not on file   Social History Narrative   • Not on file     Social Determinants of Health     Financial Resource Strain: Not on file   Food Insecurity: Not on file   Transportation Needs: Not on file   Physical Activity: Not on file   Stress: Not on file   Social Connections: Not on file   Intimate Partner Violence: Not on file   Housing Stability: Not on file       Current Medications  Current Outpatient Medications   Medication Sig Dispense Refill   • meclizine (ANTIVERT) 12 5 MG tablet Take 12 5 mg by mouth if needed  0   • omeprazole (PriLOSEC) 20 mg delayed release capsule Take 20 mg by mouth every other day     • amoxicillin-clavulanate (AUGMENTIN) 875-125 mg per tablet TAKE 1 TABLET BY MOUTH TWICE A DAY UNTIL FINISHED (Patient not taking: No sig reported)  0   • Azelastine HCl 137 MCG/SPRAY SOLN INSTILL 2 SPRAYS INTO EACH NOSTRIL TWICE A DAY (Patient not taking: No sig reported)  1   • cefuroxime (CEFTIN) 500 mg tablet TAKE 1 TABLET BY MOUTH TWICE A DAY FOR 7 DAYS (Patient not taking: No sig reported)  0   • [facility] ALLERGY RELIEF D  MG per tablet TAKE 1 TABLET BY MOUTH IN THE MORNING FOR 10 DAYS (Patient not taking: No sig reported)  0   • fluticasone (FLONASE) 50 mcg/act nasal spray 2 sprays daily (Patient not taking: Reported on 9/17/2021)  1   • hydrocortisone (ANUSOL-HC, PROCTOSOL HC,) 2 5 % rectal cream Insert into the rectum 2 (two) times a day as needed for hemorrhoids (Patient not taking: Reported on 5/18/2021) 30 g 5   • ibuprofen (MOTRIN) 600 mg tablet Take 1 tablet (600 mg total) by mouth every 8 (eight) hours for 10 days 30 tablet 0   • methocarbamol (ROBAXIN) 500 mg tablet Take 1 tablet (500 mg total) by mouth 3 (three) times a day (Patient not taking: Reported on 9/17/2021) 30 tablet 0   • multivitamin (THERAGRAN) TABS Take 1 tablet by mouth daily (Patient not taking: Reported on 9/17/2021)     • naproxen (NAPROSYN) 500 mg tablet Take 1 tablet (500 mg total) by mouth 2 (two) times a day with meals (Patient not taking: Reported on 7/16/2019) 30 tablet 0   • naproxen (NAPROSYN) 500 mg tablet Take 1 tablet (500 mg total) by mouth 2 (two) times a day with meals (Patient not taking: Reported on 9/17/2021) 30 tablet 0   • Omega-3 Fatty Acids (FISH OIL CONCENTRATE PO) Take by mouth (Patient not taking: Reported on 9/17/2021)     • predniSONE 20 mg tablet TAKE 2 TABLETS EVERY DAY FOR 3 DAYS THEN TAKE 1 TABLET EVERY DAY FOR 4 DAYS (Patient not taking: No sig reported)  0   • rosuvastatin (CRESTOR) 10 MG tablet Take 10 mg by mouth daily (Patient not taking: Reported on 9/17/2021)     • Saw Palmetto, Serenoa repens, 450 MG CAPS Take by mouth (Patient not taking: Reported on 12/30/2022)       No current facility-administered medications for this visit  Allergies  No Known Allergies    Past Medical History, Social History, Family History, medications and allergies were reviewed  Vitals  Vitals:    12/30/22 1302   BP: 148/92   BP Location: Left arm   Patient Position: Sitting   Cuff Size: Adult   Pulse: 93   SpO2: 94%   Weight: 83 kg (183 lb)       Physical Exam  Physical Exam  On examination he is in no acute distress  His abdomen is soft nontender nondistended   examination reveals normal phallus, scrotum and scrotal contents  Digital rectal examination reveals a 50+ grams prostate which is smooth and firm but without nodularity  Skin is warm  Extremities without edema    Neurologic is grossly intact and nonfocal   Gait normal   Affect normal    Results  Lab Results   Component Value Date    PSA 1 1 10/05/2022    PSA 1 1 07/19/2021    PSA 1 1 07/19/2021     Lab Results   Component Value Date    CALCIUM 9 3 10/05/2022    K 4 1 10/05/2022    CO2 28 10/05/2022     10/05/2022    BUN 9 10/05/2022    CREATININE 0 91 10/05/2022     Lab Results   Component Value Date    WBC 3 28 (L) 10/05/2022    HGB 16 1 10/05/2022    HCT 53 0 (H) 10/05/2022    MCV 91 10/05/2022     10/05/2022         Office Urine Dip  No results found for this or any previous visit (from the past 1 hour(s)) ]

## 2023-04-25 NOTE — PROGRESS NOTES
4/27/2023    Darinel Smith  1960  [id number]      Assessment  -BPH with lower urinary tract symptoms    Discussion/Plan  Martha Monge is a 58 y o  male being managed by Dr Juany Ibarra  1  BPH with lower urinary tract symptoms-PVR in the office today is 49 mL  Insufficient volume voided to obtain accurate uroflow assessment  He reports mild improvement in his urinary symptoms since starting alfuzosin  He will continue to take daily  Prescription refill was sent to his pharmacy  I also discussed dietary and behavioral modifications including limiting bladder irritants as this is likely contributing to his symptoms  Next PSA due in October 2023  Continue annual prostate cancer screening given his strong family history of prostate malignancy  Follow-up in October 2023 with PSA, RISSA, and PVR assessment  He was advised to call sooner with any questions or issues     -All questions answered, patient agrees with plan      History of Present Illness  58 y o  male with a history of BPH presents today for follow up  Patient last seen in the office in December 2022  He was started on Alfuzosin 10mg daily for management of nocturia, urinary frequency, and urgency  Patient has noticed a mild improvement in his symptoms  He continues to report episodes of urinary frequency primarily after consuming coffee  He also states he consumes large quantities of water daily  Patient is physically active  Last PSA from 10/5/22 was 1 1  He states his uncle had a history of prostate malignancy  Review of Systems  Review of Systems   Constitutional: Negative  HENT: Negative  Respiratory: Negative  Cardiovascular: Negative  Gastrointestinal: Negative  Genitourinary: Positive for frequency and urgency  Negative for decreased urine volume, difficulty urinating, dysuria, flank pain and hematuria  Musculoskeletal: Negative  Skin: Negative  Neurological: Negative  Psychiatric/Behavioral: Negative  Past Medical History  Past Medical History:   Diagnosis Date   • BPH (benign prostatic hyperplasia)    • GERD (gastroesophageal reflux disease)    • Hyperlipidemia        Past Social History  Past Surgical History:   Procedure Laterality Date   • COLONOSCOPY     • ESOPHAGOGASTRODUODENOSCOPY     • NV COLONOSCOPY FLX DX W/COLLJ SPEC WHEN PFRMD N/A 2017    Procedure: COLONOSCOPY;  Surgeon: Wilmer Castro MD;  Location: [facility];   Service: Gastroenterology       Past Family History  Family History   Problem Relation Age of Onset   • Breast cancer Mother    • Diabetes Mother    • Hypertension Mother    • Lung cancer Mother        Past Social history  Social History     Socioeconomic History   • Marital status: /Civil Union     Spouse name: Not on file   • Number of children: Not on file   • Years of education: Not on file   • Highest education level: Not on file   Occupational History   • Not on file   Tobacco Use   • Smoking status: Former     Packs/day: 0 25     Types: Cigarettes     Quit date: 2017     Years since quittin 8     Passive exposure: Past   • Smokeless tobacco: Never   Vaping Use   • Vaping Use: Never used   Substance and Sexual Activity   • Alcohol use: Yes     Comment: social   • Drug use: No   • Sexual activity: Yes     Partners: Female   Other Topics Concern   • Not on file   Social History Narrative   • Not on file     Social Determinants of Health     Financial Resource Strain: Not on file   Food Insecurity: Not on file   Transportation Needs: Not on file   Physical Activity: Not on file   Stress: Not on file   Social Connections: Not on file   Intimate Partner Violence: Not on file   Housing Stability: Not on file       Current Medications  Current Outpatient Medications   Medication Sig Dispense Refill   • alfuzosin (UROXATRAL) 10 mg 24 hr tablet Take 1 tablet (10 mg total) by mouth daily 90 tablet 3   • amoxicillin-clavulanate (AUGMENTIN) 875-125 mg per tablet TAKE 1 TABLET BY MOUTH TWICE A DAY UNTIL FINISHED (Patient not taking: No sig reported)  0   • Azelastine HCl 137 MCG/SPRAY SOLN INSTILL 2 SPRAYS INTO EACH NOSTRIL TWICE A DAY (Patient not taking: No sig reported)  1   • cefuroxime (CEFTIN) 500 mg tablet TAKE 1 TABLET BY MOUTH TWICE A DAY FOR 7 DAYS (Patient not taking: No sig reported)  0   • [facility] ALLERGY RELIEF D  MG per tablet TAKE 1 TABLET BY MOUTH IN THE MORNING FOR 10 DAYS (Patient not taking: No sig reported)  0   • fluticasone (FLONASE) 50 mcg/act nasal spray 2 sprays daily (Patient not taking: Reported on 9/17/2021)  1   • hydrocortisone (ANUSOL-HC, PROCTOSOL HC,) 2 5 % rectal cream Insert into the rectum 2 (two) times a day as needed for hemorrhoids (Patient not taking: Reported on 5/18/2021) 30 g 5   • ibuprofen (MOTRIN) 600 mg tablet Take 1 tablet (600 mg total) by mouth every 8 (eight) hours for 10 days 30 tablet 0   • meclizine (ANTIVERT) 12 5 MG tablet Take 12 5 mg by mouth if needed  0   • methocarbamol (ROBAXIN) 500 mg tablet Take 1 tablet (500 mg total) by mouth 3 (three) times a day (Patient not taking: Reported on 9/17/2021) 30 tablet 0   • multivitamin (THERAGRAN) TABS Take 1 tablet by mouth daily (Patient not taking: Reported on 9/17/2021)     • naproxen (NAPROSYN) 500 mg tablet Take 1 tablet (500 mg total) by mouth 2 (two) times a day with meals (Patient not taking: Reported on 7/16/2019) 30 tablet 0   • naproxen (NAPROSYN) 500 mg tablet Take 1 tablet (500 mg total) by mouth 2 (two) times a day with meals (Patient not taking: Reported on 9/17/2021) 30 tablet 0   • Omega-3 Fatty Acids (FISH OIL CONCENTRATE PO) Take by mouth (Patient not taking: Reported on 9/17/2021)     • omeprazole (PriLOSEC) 20 mg delayed release capsule Take 20 mg by mouth every other day     • predniSONE 20 mg tablet TAKE 2 TABLETS EVERY DAY FOR 3 DAYS THEN TAKE 1 TABLET EVERY DAY FOR 4 DAYS (Patient not taking: No sig reported)  0   • rosuvastatin (CRESTOR) 10 MG tablet Take 10 mg by mouth daily (Patient not taking: Reported on 9/17/2021)     • Saw Palmetto, Serenoa repens, 450 MG CAPS Take by mouth (Patient not taking: Reported on 12/30/2022)       No current facility-administered medications for this visit  Allergies  No Known Allergies    Past Medical History, Social History, Family History, medications and allergies were reviewed  Vitals  There were no vitals filed for this visit  Physical Exam  Physical Exam  Constitutional:       Appearance: Normal appearance  He is well-developed  HENT:      Head: Normocephalic  Eyes:      Pupils: Pupils are equal, round, and reactive to light  Pulmonary:      Effort: Pulmonary effort is normal    Abdominal:      Palpations: Abdomen is soft  Musculoskeletal:         General: Normal range of motion  Cervical back: Normal range of motion  Skin:     General: Skin is warm and dry  Neurological:      General: No focal deficit present  Mental Status: He is alert and oriented to person, place, and time  Psychiatric:         Mood and Affect: Mood normal          Behavior: Behavior normal          Thought Content: Thought content normal          Judgment: Judgment normal          Results    I have personally reviewed all pertinent lab results and reviewed with patient  Lab Results   Component Value Date    PSA 1 1 10/05/2022    PSA 1 1 07/19/2021    PSA 1 1 07/19/2021     Lab Results   Component Value Date    CALCIUM 9 3 10/05/2022    K 4 1 10/05/2022    CO2 28 10/05/2022     10/05/2022    BUN 9 10/05/2022    CREATININE 0 91 10/05/2022     Lab Results   Component Value Date    WBC 3 28 (L) 10/05/2022    HGB 16 1 10/05/2022    HCT 53 0 (H) 10/05/2022    MCV 91 10/05/2022     10/05/2022     No results found for this or any previous visit (from the past 1 hour(s))

## 2023-04-27 ENCOUNTER — OFFICE VISIT (OUTPATIENT)
Dept: UROLOGY | Facility: AMBULATORY SURGERY CENTER | Age: 63
End: 2023-04-27

## 2023-04-27 ENCOUNTER — TELEPHONE (OUTPATIENT)
Dept: UROLOGY | Facility: MEDICAL CENTER | Age: 63
End: 2023-04-27

## 2023-04-27 VITALS
DIASTOLIC BLOOD PRESSURE: 78 MMHG | SYSTOLIC BLOOD PRESSURE: 128 MMHG | BODY MASS INDEX: 29.41 KG/M2 | RESPIRATION RATE: 18 BRPM | HEIGHT: 66 IN | WEIGHT: 183 LBS | OXYGEN SATURATION: 98 % | HEART RATE: 95 BPM

## 2023-04-27 DIAGNOSIS — Z80.42 FAMILY HISTORY OF PROSTATE CANCER: ICD-10-CM

## 2023-04-27 DIAGNOSIS — Z12.5 SCREENING FOR PROSTATE CANCER: ICD-10-CM

## 2023-04-27 DIAGNOSIS — N40.1 BENIGN PROSTATIC HYPERPLASIA WITH NOCTURIA: Primary | ICD-10-CM

## 2023-04-27 DIAGNOSIS — R35.1 BENIGN PROSTATIC HYPERPLASIA WITH NOCTURIA: Primary | ICD-10-CM

## 2023-04-27 LAB — POST-VOID RESIDUAL VOLUME, ML POC: 49 ML

## 2023-04-27 RX ORDER — ALFUZOSIN HYDROCHLORIDE 10 MG/1
10 TABLET, EXTENDED RELEASE ORAL DAILY
Qty: 90 TABLET | Refills: 3 | Status: SHIPPED | OUTPATIENT
Start: 2023-04-27

## 2023-04-27 NOTE — TELEPHONE ENCOUNTER
Pt called stated that [facility] does not use [facility] anymore and he wants to use [facility] in [facility] on [address]    Pt [facility] but for future refills        Pt also requested a call back from provider regarding if he can drink coffee, juice, etc

## 2023-04-27 NOTE — TELEPHONE ENCOUNTER
Removed [facility] pharmacy from list, and updated [facility] on [address] [facility] as his primary pharmacy  Patient may drink those beverages, but would review bladder irritants, as coffee can increase his symptoms of frequency and urgency

## 2023-04-27 NOTE — TELEPHONE ENCOUNTER
Called patient to let him know that we did change his pharmacy to the one in [facility] on [address] as his pharmacy  And as far as bladder irritants try to avoid coffee, teas, sodas,ect  Pt is aware of all this information and said that he will do so

## 2023-05-26 ENCOUNTER — NURSE TRIAGE (OUTPATIENT)
Dept: OTHER | Facility: OTHER | Age: 63
End: 2023-05-26

## 2023-05-26 DIAGNOSIS — N40.1 BENIGN PROSTATIC HYPERPLASIA WITH NOCTURIA: ICD-10-CM

## 2023-05-26 DIAGNOSIS — R35.1 BENIGN PROSTATIC HYPERPLASIA WITH NOCTURIA: ICD-10-CM

## 2023-05-26 NOTE — TELEPHONE ENCOUNTER
"  Reason for Disposition  • Patient has refills remaining on their prescription    Answer Assessment - Initial Assessment Questions  1  NAME of MEDICATION: \"What medicine are you calling about? \"      alfuzosin    2  QUESTION: Deryl Heimlich is your question? \" (e g , medication refill, side effect)      Refill    3  PRESCRIBING HCP: \"Who prescribed it? \" Reason: if prescribed by specialist, call should be referred to that group        Urology    Protocols used: MEDICATION QUESTION CALL-ADULT-[place]    "

## 2023-05-26 NOTE — TELEPHONE ENCOUNTER
"Regarding: Urgent Medication Refill  ----- Message from Sandra Covington sent at 5/26/2023  3:15 PM EDT -----  \"My  only has one pill of the alfuzosin (UROXATRAL) 10 mg 24 hr tablet left  He was supposed to get his script filled but they called it into the wrong pharmacy and that was a month ago  Our prescription plan only allows us to have it filled one month at a time  I need it to go to the [facility] on [address] [facility] [address] [facility] in [place]  \"    "

## 2023-05-30 RX ORDER — ALFUZOSIN HYDROCHLORIDE 10 MG/1
10 TABLET, EXTENDED RELEASE ORAL DAILY
Qty: 90 TABLET | Refills: 3 | Status: SHIPPED | OUTPATIENT
Start: 2023-05-30

## 2023-06-30 NOTE — PROGRESS NOTES
PT Evaluation     Today's date: 2019  Patient name: Sharlene Ramirez  : 1960  MRN: [id number]  Referring provider: Good Ferreira MD  Dx:   Encounter Diagnosis     ICD-10-CM    1  Dizziness R42                   Assessment  Assessment details: Pt presents to physical therapy with a diagnosis of vertigo  At this time all oculomotor and vestibular testing is within normal limits  POsitoinal testing for BPPV was negative  Pt had slight feeling of dizziness  It is likely that patient had BPPV and it self corrected  Pt was assigned habituation exercise to reduce momentary feeling of dizziness when changing positions  Pt will return for reassess in one week following HEP  Understanding of Dx/Px/POC: good   Prognosis: good    Goals  STG and LTG  PT will report no dizziness wihtin a week    Plan  Patient would benefit from: skilled physical therapy  Planned therapy interventions: neuromuscular re-education and home exercise program  Frequency: 1x week  Duration in weeks: 2  Plan of Care beginning date: 1/15/2019  Plan of Care expiration date: 3/29/2019  Treatment plan discussed with: patient        Subjective Evaluation    History of Present Illness  Mechanism of injury: Pt reports having dizziness and had to take a day off of work with a 3 day course of dizziness  One year ago he was given medication and exercise and it didn't happen again  He reports getting dizzy getting up out of bed about one week ago  Mostly all dizziness is with position changes  He reports meclazine has helped  He has not had imaging, consulted with ENT at this time     Recurrent probem    Pain  No pain reported    Social Support    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[employer])  Patient Goals  Patient goal: decreaes dizziness        Objective        Dysequilibrium: No  Lightheadedness: No  Vertigo: Yes  Rocking or Swaying: No         Oscillopsia: No  Diplopia: No  Motion sickness:NO  Floating, Swimming, Disconnected: Yes    Exacerbation Factors:  Bending over: Yes  Turning Head: No  Rolling in bed: Yes  Walking: No  Looking up: No  Supine to/from sitting: Yes  Optokinetic movement: No  Walking in busy environment: No    Duration of Symptoms:few seconds     Concurrent Complaints:  Tinnitus:Yes  Aural Fullness: Yes  Known hearing loss:No  Nausea, Vomiting: Yes  Altered Vision: No  Poor Concentration: No  Memory Loss: No  Peripheral Neuropathy:No  Cervical Pain: No   Headache: Yes      PHYSICAL FINDINGS:  Oculomotor ROM :WNL  Resting nystagmus: No  Gaze holding nystagmus Yes To right  Smooth pursuit Normal    Vertical Saccades:Normal  Horizontal Saccades:Normal  Convergence: Normal      Head thrust (room light): Normal    Dynamic Visual Acuity: adequate  2 lines difference      MCTSIB  30 eyes open firm surface   30 eyes closed form surface  30 eyes open foam surface  30 eyes closed foam surface      DHI: 22  0-30 mild , 30-60 moderate,  severe disability      Positional testing: Right Left   Cliff [place] pike WNL WNL   Roll test: WNl WNL       Cervical ROM:WNL  Flexion:  Extension:  Right rotation:  Left rotation:  Right lateral flexion:  Left lateral flexion:        Flowsheet Rows      Most Recent Value   PT/OT G-Codes   Current Score  97   Projected Score  100          Precautions: na    Daily Treatment Diary     Manual                                                                                   Exercise Diary                                                                                                                                                                                                                                                                                      Modalities Post-Care Instructions: I reviewed with the patient in detail post-care instructions. Patient is not to engage in any heavy lifting, exercise, or swimming for the next 14 days. Should the patient develop any fevers, chills, bleeding, severe pain patient will contact the office immediately.

## 2023-12-07 ENCOUNTER — TELEPHONE (OUTPATIENT)
Age: 63
End: 2023-12-07

## 2023-12-07 NOTE — TELEPHONE ENCOUNTER
Message not viewed by the patient in the patient portal.    Sent to [employer] pool - Please notify patient.     Pt called to reschedule his previously cancelled 6 month follow up in conversation he stated the medication prescribed Alfuzosin makes him dizzy and has been taking every other day,please contact him directly with recommendation.

## 2023-12-08 NOTE — TELEPHONE ENCOUNTER
Called and spoke with patient. Informed on AP's note. Suggested patient try taking medication after supper to see if that helps with his dizziness. He was appreciative of the call.

## 2024-01-31 ENCOUNTER — APPOINTMENT (OUTPATIENT)
Dept: LAB | Facility: CLINIC | Age: 64
End: 2024-01-31
Payer: COMMERCIAL

## 2024-01-31 DIAGNOSIS — Z00.00 ROUTINE GENERAL MEDICAL EXAMINATION AT A HEALTH CARE FACILITY: ICD-10-CM

## 2024-01-31 DIAGNOSIS — Z12.5 SCREENING FOR PROSTATE CANCER: ICD-10-CM

## 2024-01-31 LAB
ALBUMIN SERPL BCP-MCNC: 4.5 G/DL (ref 3.5–5)
ALP SERPL-CCNC: 92 U/L (ref 34–104)
ALT SERPL W P-5'-P-CCNC: 29 U/L (ref 7–52)
ANION GAP SERPL CALCULATED.3IONS-SCNC: 3 MMOL/L
AST SERPL W P-5'-P-CCNC: 23 U/L (ref 13–39)
BASOPHILS # BLD AUTO: 0.06 THOUSANDS/ÂΜL (ref 0–0.1)
BASOPHILS NFR BLD AUTO: 2 % (ref 0–1)
BILIRUB SERPL-MCNC: 0.62 MG/DL (ref 0.2–1)
BUN SERPL-MCNC: 13 MG/DL (ref 5–25)
CALCIUM SERPL-MCNC: 9.9 MG/DL (ref 8.4–10.2)
CHLORIDE SERPL-SCNC: 103 MMOL/L (ref 96–108)
CHOLEST SERPL-MCNC: 239 MG/DL
CO2 SERPL-SCNC: 31 MMOL/L (ref 21–32)
CREAT SERPL-MCNC: 0.96 MG/DL (ref 0.6–1.3)
EOSINOPHIL # BLD AUTO: 0.07 THOUSAND/ÂΜL (ref 0–0.61)
EOSINOPHIL NFR BLD AUTO: 2 % (ref 0–6)
ERYTHROCYTE [DISTWIDTH] IN BLOOD BY AUTOMATED COUNT: 16.5 % (ref 11.6–15.1)
GFR SERPL CREATININE-BSD FRML MDRD: 83 ML/MIN/1.73SQ M
GLUCOSE P FAST SERPL-MCNC: 95 MG/DL (ref 65–99)
HCT VFR BLD AUTO: 52.7 % (ref 36.5–49.3)
HDLC SERPL-MCNC: 42 MG/DL
HGB BLD-MCNC: 16.9 G/DL (ref 12–17)
IMM GRANULOCYTES # BLD AUTO: 0.01 THOUSAND/UL (ref 0–0.2)
IMM GRANULOCYTES NFR BLD AUTO: 0 % (ref 0–2)
LDLC SERPL CALC-MCNC: 152 MG/DL (ref 0–100)
LYMPHOCYTES # BLD AUTO: 1.57 THOUSANDS/ÂΜL (ref 0.6–4.47)
LYMPHOCYTES NFR BLD AUTO: 39 % (ref 14–44)
MCH RBC QN AUTO: 27.3 PG (ref 26.8–34.3)
MCHC RBC AUTO-ENTMCNC: 32.1 G/DL (ref 31.4–37.4)
MCV RBC AUTO: 85 FL (ref 82–98)
MONOCYTES # BLD AUTO: 0.37 THOUSAND/ÂΜL (ref 0.17–1.22)
MONOCYTES NFR BLD AUTO: 9 % (ref 4–12)
NEUTROPHILS # BLD AUTO: 1.91 THOUSANDS/ÂΜL (ref 1.85–7.62)
NEUTS SEG NFR BLD AUTO: 48 % (ref 43–75)
NONHDLC SERPL-MCNC: 197 MG/DL
NRBC BLD AUTO-RTO: 0 /100 WBCS
PLATELET # BLD AUTO: 186 THOUSANDS/UL (ref 149–390)
PMV BLD AUTO: 12 FL (ref 8.9–12.7)
POTASSIUM SERPL-SCNC: 4.4 MMOL/L (ref 3.5–5.3)
PROT SERPL-MCNC: 7.3 G/DL (ref 6.4–8.4)
PSA SERPL-MCNC: 1.76 NG/ML (ref 0–4)
PSA SERPL-MCNC: 1.93 NG/ML (ref 0–4)
RBC # BLD AUTO: 6.18 MILLION/UL (ref 3.88–5.62)
SODIUM SERPL-SCNC: 137 MMOL/L (ref 135–147)
T3FREE SERPL-MCNC: 3.73 PG/ML (ref 2.5–3.9)
T4 FREE SERPL-MCNC: 0.75 NG/DL (ref 0.61–1.12)
TRIGL SERPL-MCNC: 227 MG/DL
TSH SERPL DL<=0.05 MIU/L-ACNC: 1.76 UIU/ML (ref 0.45–4.5)
WBC # BLD AUTO: 3.99 THOUSAND/UL (ref 4.31–10.16)

## 2024-01-31 PROCEDURE — 36415 COLL VENOUS BLD VENIPUNCTURE: CPT

## 2024-01-31 PROCEDURE — 80053 COMPREHEN METABOLIC PANEL: CPT

## 2024-01-31 PROCEDURE — 84439 ASSAY OF FREE THYROXINE: CPT

## 2024-01-31 PROCEDURE — 84153 ASSAY OF PSA TOTAL: CPT

## 2024-01-31 PROCEDURE — 84443 ASSAY THYROID STIM HORMONE: CPT

## 2024-01-31 PROCEDURE — G0103 PSA SCREENING: HCPCS

## 2024-01-31 PROCEDURE — 85025 COMPLETE CBC W/AUTO DIFF WBC: CPT

## 2024-01-31 PROCEDURE — 84481 FREE ASSAY (FT-3): CPT

## 2024-01-31 PROCEDURE — 80061 LIPID PANEL: CPT

## 2024-03-21 ENCOUNTER — OFFICE VISIT (OUTPATIENT)
Dept: SURGERY | Facility: CLINIC | Age: 64
End: 2024-03-21
Payer: COMMERCIAL

## 2024-03-21 DIAGNOSIS — M67.441 GANGLION CYST OF FINGER OF RIGHT HAND: Primary | ICD-10-CM

## 2024-03-21 PROCEDURE — 99242 OFF/OP CONSLTJ NEW/EST SF 20: CPT | Performed by: SURGERY

## 2024-03-21 NOTE — LETTER
March 21, 2024     Juan Manuel Chatman MD  [address] [id number]    Patient: Trey Presley   YOB: 1960   Date of Visit: 3/21/2024       Dear Dr. Chatman:    Thank you for referring Trey Presley to me for evaluation. Below are my notes for this consultation.    If you have questions, please do not hesitate to call me. I look forward to following your patient along with you.         Sincerely,        Marcelino Manrique,         CC: MD Marcelino Garcia DO  3/21/2024  3:17 PM  Sign when Signing Visit  Assessment/Plan:    Diagnoses and all orders for this visit:    Ganglion cyst of finger of right hand    Will refer to hand surgery.    Subjective:      Patient ID: Trey Presley is a 63 y.o. male.    Patient presents for evaluation of a lump on his right ring finger.  States he has had the lump for 2 months.  The lump has increased in size.  Denies pain.  Patient was not sure if he actually injury to the cuticle.  Denies any erythema, tenderness, or fevers.        The following portions of the patient's history were reviewed and updated as appropriate:     He  has a past medical history of BPH (benign prostatic hyperplasia), GERD (gastroesophageal reflux disease), and Hyperlipidemia.  He  has a past surgical history that includes Esophagogastroduodenoscopy; Colonoscopy; and pr colonoscopy flx dx w/collj spec when pfrmd (N/A, 2/13/2017).  His family history includes Breast cancer in his mother; Diabetes in his mother; Hypertension in his mother; Lung cancer in his mother.  He  reports that he quit smoking about 6 years ago. His smoking use included cigarettes. He has been exposed to tobacco smoke. He has never used smokeless tobacco. He reports current alcohol use. He reports that he does not use drugs.  Current Outpatient Medications   Medication Sig Dispense Refill   • alfuzosin (UROXATRAL) 10 mg 24 hr tablet Take 1 tablet (10 mg total) by mouth daily 90 tablet 3    • amoxicillin-clavulanate (AUGMENTIN) 875-125 mg per tablet TAKE 1 TABLET BY MOUTH TWICE A DAY UNTIL FINISHED (Patient not taking: No sig reported)  0   • Azelastine HCl 137 MCG/SPRAY SOLN INSTILL 2 SPRAYS INTO EACH NOSTRIL TWICE A DAY (Patient not taking: No sig reported)  1   • cefuroxime (CEFTIN) 500 mg tablet TAKE 1 TABLET BY MOUTH TWICE A DAY FOR 7 DAYS (Patient not taking: No sig reported)  0   • [facility] ALLERGY RELIEF D  MG per tablet TAKE 1 TABLET BY MOUTH IN THE MORNING FOR 10 DAYS (Patient not taking: No sig reported)  0   • fluticasone (FLONASE) 50 mcg/act nasal spray 2 sprays daily (Patient not taking: Reported on 9/17/2021)  1   • hydrocortisone (ANUSOL-HC, PROCTOSOL HC,) 2.5 % rectal cream Insert into the rectum 2 (two) times a day as needed for hemorrhoids (Patient not taking: Reported on 5/18/2021) 30 g 5   • ibuprofen (MOTRIN) 600 mg tablet Take 1 tablet (600 mg total) by mouth every 8 (eight) hours for 10 days 30 tablet 0   • meclizine (ANTIVERT) 12.5 MG tablet Take 12.5 mg by mouth if needed  0   • methocarbamol (ROBAXIN) 500 mg tablet Take 1 tablet (500 mg total) by mouth 3 (three) times a day (Patient not taking: Reported on 9/17/2021) 30 tablet 0   • multivitamin (THERAGRAN) TABS Take 1 tablet by mouth daily (Patient not taking: Reported on 9/17/2021)     • naproxen (NAPROSYN) 500 mg tablet Take 1 tablet (500 mg total) by mouth 2 (two) times a day with meals (Patient not taking: Reported on 7/16/2019) 30 tablet 0   • naproxen (NAPROSYN) 500 mg tablet Take 1 tablet (500 mg total) by mouth 2 (two) times a day with meals (Patient not taking: Reported on 9/17/2021) 30 tablet 0   • Omega-3 Fatty Acids (FISH OIL CONCENTRATE PO) Take by mouth (Patient not taking: Reported on 9/17/2021)     • omeprazole (PriLOSEC) 20 mg delayed release capsule Take 20 mg by mouth every other day     • predniSONE 20 mg tablet TAKE 2 TABLETS EVERY DAY FOR 3 DAYS THEN TAKE 1 TABLET EVERY DAY FOR 4 DAYS (Patient not  taking: No sig reported)  0   • rosuvastatin (CRESTOR) 10 MG tablet Take 10 mg by mouth daily (Patient not taking: Reported on 9/17/2021)     • Saw Palmetto, Serenoa repens, 450 MG CAPS Take by mouth (Patient not taking: Reported on 12/30/2022)       No current facility-administered medications for this visit.     He has No Known Allergies..    Review of Systems   All other systems reviewed and are negative.        Objective:      There were no vitals taken for this visit.         Physical Exam  Musculoskeletal:      Comments: Protuberant fluid-filled lump off the dorsal aspect of the distal interphalangeal joint of the right digit.  No signs of infection.   Neurological:      Mental Status: He is alert.

## 2024-03-21 NOTE — PROGRESS NOTES
Assessment/Plan:    Diagnoses and all orders for this visit:    Ganglion cyst of finger of right hand    Will refer to hand surgery.    Subjective:      Patient ID: Trey Presley is a 63 y.o. male.    Patient presents for evaluation of a lump on his right ring finger.  States he has had the lump for 2 months.  The lump has increased in size.  Denies pain.  Patient was not sure if he actually injury to the cuticle.  Denies any erythema, tenderness, or fevers.        The following portions of the patient's history were reviewed and updated as appropriate:     He  has a past medical history of BPH (benign prostatic hyperplasia), GERD (gastroesophageal reflux disease), and Hyperlipidemia.  He  has a past surgical history that includes Esophagogastroduodenoscopy; Colonoscopy; and pr colonoscopy flx dx w/collj spec when pfrmd (N/A, 2/13/2017).  His family history includes Breast cancer in his mother; Diabetes in his mother; Hypertension in his mother; Lung cancer in his mother.  He  reports that he quit smoking about 6 years ago. His smoking use included cigarettes. He has been exposed to tobacco smoke. He has never used smokeless tobacco. He reports current alcohol use. He reports that he does not use drugs.  Current Outpatient Medications   Medication Sig Dispense Refill    alfuzosin (UROXATRAL) 10 mg 24 hr tablet Take 1 tablet (10 mg total) by mouth daily 90 tablet 3    amoxicillin-clavulanate (AUGMENTIN) 875-125 mg per tablet TAKE 1 TABLET BY MOUTH TWICE A DAY UNTIL FINISHED (Patient not taking: No sig reported)  0    Azelastine HCl 137 MCG/SPRAY SOLN INSTILL 2 SPRAYS INTO EACH NOSTRIL TWICE A DAY (Patient not taking: No sig reported)  1    cefuroxime (CEFTIN) 500 mg tablet TAKE 1 TABLET BY MOUTH TWICE A DAY FOR 7 DAYS (Patient not taking: No sig reported)  0    [facility] ALLERGY RELIEF D  MG per tablet TAKE 1 TABLET BY MOUTH IN THE MORNING FOR 10 DAYS (Patient not taking: No sig reported)  0    fluticasone (FLONASE)  50 mcg/act nasal spray 2 sprays daily (Patient not taking: Reported on 9/17/2021)  1    hydrocortisone (ANUSOL-HC, PROCTOSOL HC,) 2.5 % rectal cream Insert into the rectum 2 (two) times a day as needed for hemorrhoids (Patient not taking: Reported on 5/18/2021) 30 g 5    ibuprofen (MOTRIN) 600 mg tablet Take 1 tablet (600 mg total) by mouth every 8 (eight) hours for 10 days 30 tablet 0    meclizine (ANTIVERT) 12.5 MG tablet Take 12.5 mg by mouth if needed  0    methocarbamol (ROBAXIN) 500 mg tablet Take 1 tablet (500 mg total) by mouth 3 (three) times a day (Patient not taking: Reported on 9/17/2021) 30 tablet 0    multivitamin (THERAGRAN) TABS Take 1 tablet by mouth daily (Patient not taking: Reported on 9/17/2021)      naproxen (NAPROSYN) 500 mg tablet Take 1 tablet (500 mg total) by mouth 2 (two) times a day with meals (Patient not taking: Reported on 7/16/2019) 30 tablet 0    naproxen (NAPROSYN) 500 mg tablet Take 1 tablet (500 mg total) by mouth 2 (two) times a day with meals (Patient not taking: Reported on 9/17/2021) 30 tablet 0    Omega-3 Fatty Acids (FISH OIL CONCENTRATE PO) Take by mouth (Patient not taking: Reported on 9/17/2021)      omeprazole (PriLOSEC) 20 mg delayed release capsule Take 20 mg by mouth every other day      predniSONE 20 mg tablet TAKE 2 TABLETS EVERY DAY FOR 3 DAYS THEN TAKE 1 TABLET EVERY DAY FOR 4 DAYS (Patient not taking: No sig reported)  0    rosuvastatin (CRESTOR) 10 MG tablet Take 10 mg by mouth daily (Patient not taking: Reported on 9/17/2021)      Saw Palmetto, Serenoa repens, 450 MG CAPS Take by mouth (Patient not taking: Reported on 12/30/2022)       No current facility-administered medications for this visit.     He has No Known Allergies..    Review of Systems   All other systems reviewed and are negative.        Objective:      There were no vitals taken for this visit.         Physical Exam  Musculoskeletal:      Comments: Protuberant fluid-filled lump off the dorsal  aspect of the distal interphalangeal joint of the right digit.  No signs of infection.   Neurological:      Mental Status: He is alert.

## 2024-04-11 NOTE — PROGRESS NOTES
"  Assessment and plan:     BPH with LUTS  -Alfuzosin 10 mg, only taking sporadically, discussed stopping medication all together, if urinary symptoms recur we can reorder medication  -PVR in office today 55 mL  -Continue with diet and lifestyle modifications  -Follow up in 1 year with [facility]    Prostate cancer screening  -PSA on 1/31/2024 1.93 previously 1.1 on 10/5/2022  -Prostate cancer history with one uncle   -RISSA in office today smooth with no appreciable nodules, 20g  -Follow up in 1 year with PSA and RISSA    3. Erectile dysfunction  -Cialis 10mg ordered, side effect profile explained, take 1 hour prior to sexual intercourse, can increase dose to 20 mg if not effective      History of Present Illness     Trey Presley is a 63 y.o. male who presents today in the office for follow-up of BPH with LUTS.  He was last seen in the office in April 2023.  In 2022 he was started on alfuzosin for his urinary symptoms in which she stated that the medication was overall helping.    Today in the office he states that his urinary symptoms are overall not bothersome to him.  He has only been taking the alfuzosin sporadically and cannot remember the last time he took it.  We discussed stopping the medication altogether and if urinary symptoms to recur we can reorder the medication.  He states that he does drink a lot of water throughout the workday which contributes to his urgency at times.  He denies any urinary symptoms today in the office such as urgency, frequency, hematuria, and dysuria.  He feels as though he is emptying out his bladder fully and denies any urinary weakened stream or hesitancy.    He does have complaints of erectile dysfunction and states that he is not able to fully maintain an erection during sexual intercourse.    Laboratory     Lab Results   Component Value Date    BUN 13 01/31/2024    CREATININE 0.96 01/31/2024       No components found for: \"GFR\"    Lab Results   Component Value Date    CALCIUM 9.9 " 01/31/2024    K 4.4 01/31/2024    CO2 31 01/31/2024     01/31/2024       Lab Results   Component Value Date    WBC 3.99 (L) 01/31/2024    HGB 16.9 01/31/2024    HCT 52.7 (H) 01/31/2024    MCV 85 01/31/2024     01/31/2024       Lab Results   Component Value Date    PSA 1.93 01/31/2024    PSA 1.76 01/31/2024    PSA 1.1 10/05/2022       Review of Systems     Review of Systems   Constitutional:  Negative for chills and fever.   Respiratory: Negative.  Negative for cough and shortness of breath.    Cardiovascular: Negative.  Negative for chest pain.   Gastrointestinal: Negative.  Negative for abdominal distention, abdominal pain, nausea and vomiting.   Genitourinary:  Negative for decreased urine volume, difficulty urinating, dysuria, flank pain, frequency, hematuria, penile discharge, penile pain, penile swelling, scrotal swelling, testicular pain and urgency.   Skin: Negative.  Negative for rash.   Neurological: Negative.    Hematological:  Negative for adenopathy. Does not bruise/bleed easily.       Allergies     No Known Allergies    Physical Exam     Physical Exam  Vitals reviewed.   Constitutional:       Appearance: Normal appearance.   HENT:      Head: Normocephalic and atraumatic.   Eyes:      Pupils: Pupils are equal, round, and reactive to light.   Cardiovascular:      Rate and Rhythm: Normal rate.   Pulmonary:      Effort: Pulmonary effort is normal.   Genitourinary:     Comments: Circumcised penis, normal phallus, orthotopic patent meatus.  Testes smooth descended bilaterally into the scrotum nontender with no palpable mass.  Digital rectal exam smooth prostate, without appreciable nodule, induration or asymmetry. 20g.      Musculoskeletal:      Cervical back: Normal range of motion.   Skin:     General: Skin is warm and dry.   Neurological:      General: No focal deficit present.      Mental Status: He is alert and oriented to person, place, and time.   Psychiatric:         Mood and Affect: Mood  "normal.         Behavior: Behavior normal.         Thought Content: Thought content normal.         Judgment: Judgment normal.         Vital Signs     Vitals:    04/12/24 1047   BP: 128/82   BP Location: Left arm   Patient Position: Sitting   Cuff Size: Standard   Pulse: 85   SpO2: 95%   Weight: 82.6 kg (182 lb)   Height: 5' 6\" (1.676 m)       Current Medications       Current Outpatient Medications:     alfuzosin (UROXATRAL) 10 mg 24 hr tablet, Take 1 tablet (10 mg total) by mouth daily, Disp: 90 tablet, Rfl: 3    meclizine (ANTIVERT) 12.5 MG tablet, Take 12.5 mg by mouth if needed, Disp: , Rfl: 0    Meclizine HCl 25 MG CHEW, Chew 1 tablet every 8 (eight) hours as needed, Disp: , Rfl:     omeprazole (PriLOSEC) 20 mg delayed release capsule, Take 20 mg by mouth every other day, Disp: , Rfl:     amoxicillin-clavulanate (AUGMENTIN) 875-125 mg per tablet, TAKE 1 TABLET BY MOUTH TWICE A DAY UNTIL FINISHED (Patient not taking: No sig reported), Disp: , Rfl: 0    Azelastine HCl 137 MCG/SPRAY SOLN, INSTILL 2 SPRAYS INTO EACH NOSTRIL TWICE A DAY (Patient not taking: No sig reported), Disp: , Rfl: 1    cefuroxime (CEFTIN) 500 mg tablet, TAKE 1 TABLET BY MOUTH TWICE A DAY FOR 7 DAYS (Patient not taking: No sig reported), Disp: , Rfl: 0    [employer] ALLERGY RELIEF D  MG per tablet, TAKE 1 TABLET BY MOUTH IN THE MORNING FOR 10 DAYS (Patient not taking: No sig reported), Disp: , Rfl: 0    fluticasone (FLONASE) 50 mcg/act nasal spray, 2 sprays daily (Patient not taking: Reported on 9/17/2021), Disp: , Rfl: 1    hydrocortisone (ANUSOL-HC, PROCTOSOL HC,) 2.5 % rectal cream, Insert into the rectum 2 (two) times a day as needed for hemorrhoids (Patient not taking: Reported on 5/18/2021), Disp: 30 g, Rfl: 5    ibuprofen (MOTRIN) 600 mg tablet, Take 1 tablet (600 mg total) by mouth every 8 (eight) hours for 10 days, Disp: 30 tablet, Rfl: 0    methocarbamol (ROBAXIN) 500 mg tablet, Take 1 tablet (500 mg total) by mouth 3 (three) times a " day (Patient not taking: Reported on 9/17/2021), Disp: 30 tablet, Rfl: 0    multivitamin (THERAGRAN) TABS, Take 1 tablet by mouth daily (Patient not taking: Reported on 9/17/2021), Disp: , Rfl:     naproxen (NAPROSYN) 500 mg tablet, Take 1 tablet (500 mg total) by mouth 2 (two) times a day with meals (Patient not taking: Reported on 7/16/2019), Disp: 30 tablet, Rfl: 0    naproxen (NAPROSYN) 500 mg tablet, Take 1 tablet (500 mg total) by mouth 2 (two) times a day with meals (Patient not taking: Reported on 9/17/2021), Disp: 30 tablet, Rfl: 0    Omega-3 Fatty Acids (FISH OIL CONCENTRATE PO), Take by mouth (Patient not taking: Reported on 9/17/2021), Disp: , Rfl:     predniSONE 20 mg tablet, TAKE 2 TABLETS EVERY DAY FOR 3 DAYS THEN TAKE 1 TABLET EVERY DAY FOR 4 DAYS (Patient not taking: No sig reported), Disp: , Rfl: 0    rosuvastatin (CRESTOR) 10 MG tablet, Take 10 mg by mouth daily (Patient not taking: Reported on 9/17/2021), Disp: , Rfl:     Saw Palmetto Serenoa repens, 450 MG CAPS, Take by mouth (Patient not taking: Reported on 12/30/2022), Disp: , Rfl:     Active Problems     Patient Active Problem List   Diagnosis    Nocturia    Urinary frequency    BPH with obstruction/lower urinary tract symptoms    Left shoulder strain, initial encounter    Left lateral epicondylitis    Ganglion cyst of finger of right hand    Screening for prostate cancer       Past Medical History     Past Medical History:   Diagnosis Date    BPH (benign prostatic hyperplasia)     GERD (gastroesophageal reflux disease)     Hyperlipidemia        Surgical History     Past Surgical History:   Procedure Laterality Date    COLONOSCOPY      ESOPHAGOGASTRODUODENOSCOPY      MT COLONOSCOPY FLX DX W/COLLJ SPEC WHEN PFRMD N/A 2/13/2017    Procedure: COLONOSCOPY;  Surgeon: Ida Marsh MD;  Location: [facility];  Service: Gastroenterology       Family History     Family History   Problem Relation Age of Onset    Breast cancer Mother     Diabetes  Mother     Hypertension Mother     Lung cancer Mother        Social History     Social History     Social History     Tobacco Use   Smoking Status Former    Current packs/day: 0.00    Types: Cigarettes    Quit date: 2017    Years since quittin.7    Passive exposure: Past   Smokeless Tobacco Never       Past Surgical History:   Procedure Laterality Date    COLONOSCOPY      ESOPHAGOGASTRODUODENOSCOPY      SC COLONOSCOPY FLX DX W/COLLJ SPEC WHEN PFRMD N/A 2017    Procedure: COLONOSCOPY;  Surgeon: Ida Marsh MD;  Location: [facility];  Service: Gastroenterology         The following portions of the patient's history were reviewed and updated as appropriate: allergies, current medications, past family history, past medical history, past social history, past surgical history and problem list    Please note :  Voice dictation software has been used to create this document.  There may be inadvertent transcription errors.    GARFIELD Jeong

## 2024-04-12 ENCOUNTER — OFFICE VISIT (OUTPATIENT)
Dept: UROLOGY | Facility: AMBULATORY SURGERY CENTER | Age: 64
End: 2024-04-12
Payer: COMMERCIAL

## 2024-04-12 VITALS
BODY MASS INDEX: 29.25 KG/M2 | HEIGHT: 66 IN | OXYGEN SATURATION: 95 % | DIASTOLIC BLOOD PRESSURE: 82 MMHG | SYSTOLIC BLOOD PRESSURE: 128 MMHG | HEART RATE: 85 BPM | WEIGHT: 182 LBS

## 2024-04-12 DIAGNOSIS — N40.1 BPH WITH OBSTRUCTION/LOWER URINARY TRACT SYMPTOMS: Primary | ICD-10-CM

## 2024-04-12 DIAGNOSIS — N52.9 ERECTILE DYSFUNCTION, UNSPECIFIED ERECTILE DYSFUNCTION TYPE: ICD-10-CM

## 2024-04-12 DIAGNOSIS — Z12.5 SCREENING FOR PROSTATE CANCER: ICD-10-CM

## 2024-04-12 DIAGNOSIS — N13.8 BPH WITH OBSTRUCTION/LOWER URINARY TRACT SYMPTOMS: Primary | ICD-10-CM

## 2024-04-12 LAB — POST-VOID RESIDUAL VOLUME, ML POC: 55 ML

## 2024-04-12 PROCEDURE — 99213 OFFICE O/P EST LOW 20 MIN: CPT

## 2024-04-12 PROCEDURE — 51798 US URINE CAPACITY MEASURE: CPT

## 2024-04-12 RX ORDER — TADALAFIL 20 MG/1
20 TABLET ORAL DAILY PRN
Qty: 10 TABLET | Refills: 3 | Status: SHIPPED | OUTPATIENT
Start: 2024-04-12

## 2024-04-12 RX ORDER — MECLIZINE HCL 25MG 25 MG/1
1 TABLET, CHEWABLE ORAL EVERY 8 HOURS PRN
COMMUNITY
Start: 2024-03-27

## 2024-05-12 PROBLEM — Z12.5 SCREENING FOR PROSTATE CANCER: Status: RESOLVED | Noted: 2024-04-12 | Resolved: 2024-05-12

## 2024-05-22 ENCOUNTER — OFFICE VISIT (OUTPATIENT)
Dept: OBGYN CLINIC | Facility: HOSPITAL | Age: 64
End: 2024-05-22
Payer: COMMERCIAL

## 2024-05-22 VITALS
DIASTOLIC BLOOD PRESSURE: 69 MMHG | SYSTOLIC BLOOD PRESSURE: 127 MMHG | HEART RATE: 75 BPM | BODY MASS INDEX: 29.09 KG/M2 | HEIGHT: 66 IN | WEIGHT: 181 LBS

## 2024-05-22 DIAGNOSIS — M67.441 GANGLION CYST OF FINGER OF RIGHT HAND: Primary | ICD-10-CM

## 2024-05-22 PROCEDURE — 99204 OFFICE O/P NEW MOD 45 MIN: CPT | Performed by: ORTHOPAEDIC SURGERY

## 2024-05-22 PROCEDURE — 20612 ASPIRATE/INJ GANGLION CYST: CPT | Performed by: ORTHOPAEDIC SURGERY

## 2024-05-22 NOTE — PROGRESS NOTES
ASSESSMENT/PLAN:    Assessment:   Dorsal ganglion cyst  right ring finger DIP    Plan:   Ganglion cyst was aspirated in office today, patient tolerated procedure well  Patient should keep a band-aid at the area of the cyst for 48-72 hours, switching the band-aid out 1-2 times a day  He should return to normal ADL's within the next 1-2 days as tolerated  He will follow-up as needed    Follow Up:  PRN    To Do Next Visit:  Re-evaluation    General Discussions:  Ganglion Cysts: The anatomy and physiology of the ganglion was discussed with the patient today in the office.  Fluid leaking out of the joint surface typically creates a small sac, which can enlarge and cause pain or limitation of motion.  Treatment options include observation, aspiration, or surgical incision were discussed with the patient today.  Observation typically lead to resolution and approximately 10% of patients, aspiration least resolution approximately 50% of patients, and surgical excision lead to resolution in approximately 97% of patients.  After discussion with the patient today, the patient voiced understanding of all treatment options.    Operative Discussions:      _____________________________________________________  CHIEF COMPLAINT:  Chief Complaint   Patient presents with    Right Ring Finger - Cyst         SUBJECTIVE:  Trey Presley is a 63 y.o. male who presents with right ring finger cyst.  He states that the cyst has been present for 2 months without any mechanism of injury. He states that once he noticed the cyst growing it remained the same size. He denies any pain or discomfort.  Radiation: None  Previous Treatments: None   Associated symptoms: None  Handedness: right  Work status: Maintenance at [employer], per reinaldo discharge at [employer]    PAST MEDICAL HISTORY:  Past Medical History:   Diagnosis Date    BPH (benign prostatic hyperplasia)     GERD (gastroesophageal reflux disease)     Hyperlipidemia        PAST SURGICAL  HISTORY:  Past Surgical History:   Procedure Laterality Date    COLONOSCOPY      ESOPHAGOGASTRODUODENOSCOPY      NC COLONOSCOPY FLX DX W/COLLJ SPEC WHEN PFRMD N/A 2017    Procedure: COLONOSCOPY;  Surgeon: Ida Marsh MD;  Location[facility];  Service: Gastroenterology       FAMILY HISTORY:  Family History   Problem Relation Age of Onset    Breast cancer Mother     Diabetes Mother     Hypertension Mother     Lung cancer Mother        SOCIAL HISTORY:  Social History     Tobacco Use    Smoking status: Former     Current packs/day: 0.00     Types: Cigarettes     Quit date: 2017     Years since quittin.8     Passive exposure: Past    Smokeless tobacco: Never   Vaping Use    Vaping status: Never Used   Substance Use Topics    Alcohol use: Yes     Comment: social    Drug use: No       MEDICATIONS:    Current Outpatient Medications:     meclizine (ANTIVERT) 12.5 MG tablet, Take 12.5 mg by mouth if needed, Disp: , Rfl: 0    omeprazole (PriLOSEC) 20 mg delayed release capsule, Take 20 mg by mouth every other day, Disp: , Rfl:     tadalafil (CIALIS) 20 MG tablet, Take 1 tablet (20 mg total) by mouth daily as needed for erectile dysfunction, Disp: 10 tablet, Rfl: 3    amoxicillin-clavulanate (AUGMENTIN) 875-125 mg per tablet, TAKE 1 TABLET BY MOUTH TWICE A DAY UNTIL FINISHED (Patient not taking: No sig reported), Disp: , Rfl: 0    Azelastine HCl 137 MCG/SPRAY SOLN, INSTILL 2 SPRAYS INTO EACH NOSTRIL TWICE A DAY (Patient not taking: No sig reported), Disp: , Rfl: 1    cefuroxime (CEFTIN) 500 mg tablet, TAKE 1 TABLET BY MOUTH TWICE A DAY FOR 7 DAYS (Patient not taking: No sig reported), Disp: , Rfl: 0    [employer] ALLERGY RELIEF D  MG per tablet, TAKE 1 TABLET BY MOUTH IN THE MORNING FOR 10 DAYS (Patient not taking: No sig reported), Disp: , Rfl: 0    fluticasone (FLONASE) 50 mcg/act nasal spray, 2 sprays daily (Patient not taking: Reported on 2021), Disp: , Rfl: 1    hydrocortisone (ANUSOL-HC, PROCTOSOL  "HC,) 2.5 % rectal cream, Insert into the rectum 2 (two) times a day as needed for hemorrhoids (Patient not taking: Reported on 5/18/2021), Disp: 30 g, Rfl: 5    ibuprofen (MOTRIN) 600 mg tablet, Take 1 tablet (600 mg total) by mouth every 8 (eight) hours for 10 days, Disp: 30 tablet, Rfl: 0    Meclizine HCl 25 MG CHEW, Chew 1 tablet every 8 (eight) hours as needed, Disp: , Rfl:     methocarbamol (ROBAXIN) 500 mg tablet, Take 1 tablet (500 mg total) by mouth 3 (three) times a day (Patient not taking: Reported on 9/17/2021), Disp: 30 tablet, Rfl: 0    multivitamin (THERAGRAN) TABS, Take 1 tablet by mouth daily (Patient not taking: Reported on 9/17/2021), Disp: , Rfl:     naproxen (NAPROSYN) 500 mg tablet, Take 1 tablet (500 mg total) by mouth 2 (two) times a day with meals (Patient not taking: Reported on 7/16/2019), Disp: 30 tablet, Rfl: 0    naproxen (NAPROSYN) 500 mg tablet, Take 1 tablet (500 mg total) by mouth 2 (two) times a day with meals (Patient not taking: Reported on 9/17/2021), Disp: 30 tablet, Rfl: 0    Omega-3 Fatty Acids (FISH OIL CONCENTRATE PO), Take by mouth (Patient not taking: Reported on 9/17/2021), Disp: , Rfl:     predniSONE 20 mg tablet, TAKE 2 TABLETS EVERY DAY FOR 3 DAYS THEN TAKE 1 TABLET EVERY DAY FOR 4 DAYS (Patient not taking: No sig reported), Disp: , Rfl: 0    rosuvastatin (CRESTOR) 10 MG tablet, Take 10 mg by mouth daily (Patient not taking: Reported on 9/17/2021), Disp: , Rfl:     Saw Palmetto, Serenoa repens, 450 MG CAPS, Take by mouth (Patient not taking: Reported on 12/30/2022), Disp: , Rfl:     ALLERGIES:  No Known Allergies    REVIEW OF SYSTEMS:  Pertinent items are noted in HPI.  A comprehensive review of systems was negative.    LABS:  HgA1c: No results found for: \"HGBA1C\"  BMP:   Lab Results   Component Value Date    CALCIUM 9.9 01/31/2024    K 4.4 01/31/2024    CO2 31 01/31/2024     01/31/2024    BUN 13 01/31/2024    CREATININE 0.96 01/31/2024 " "        _____________________________________________________  PHYSICAL EXAMINATION:  Vital signs: /69   Pulse 75   Ht 5' 6\" (1.676 m)   Wt 82.1 kg (181 lb)   BMI 29.21 kg/m²   General: well developed and well nourished, alert, oriented times 3, and appears comfortable  Psychiatric: Normal  HEENT: Trachea Midline, No torticollis  Cardiovascular: No discernable arrhythmia  Pulmonary: No wheezing or stridor  Abdomen: No rebound or guarding  Extremities: No peripheral edema  Skin: No masses, erythema, lacerations, fluctation, ulcerations, Ganglion cyst present at dorsal right ring finger  Neurovascular: Sensation Intact to the Median, Ulnar, Radial Nerve, Motor Intact to the Median, Ulnar, Radial Nerve, and Pulses Intact    MUSCULOSKELETAL EXAMINATION:  right Hand -    Patient presents with no obvious anatomical deformity  Skin is warm and dry to touch with no signs of erythema, ecchymosis, or infection  8x8 mm ganglion cyst at radial dorsal DIP joint  Full FDS, FDP, extensor mechanisms are intact  No rotational deformity with composite finger flexion  Demonstrates normal wrist, elbow, and shoulder motion  Forearm compartments are soft and supple  2+ distal radial pulse with brisk capillary refill to the fingers  Radial, median, and ulnar motor and sensory distribution intact  Sensations light to touch intact distally    _____________________________________________________  STUDIES REVIEWED:  No Studies to review      PROCEDURES PERFORMED:  Hand/upper extremity injection  Universal Protocol:  Consent: Verbal consent obtained.  Consent given by: patient  Patient understanding: patient states understanding of the procedure being performed  Site marked: the operative site was marked  Patient identity confirmed: verbally with patient  Supporting Documentation  Indications: joint swelling and pain   Procedure Details  Condition comment: Ganglion cyst of right ring finger   Preparation: Patient was prepped and " draped in the usual sterile fashion  Needle size: 25 G  Ultrasound guidance: no  Approach: dorsal  Aspirate amount: 0.5 mL  Aspirate: blood-tinged  Patient tolerance: patient tolerated the procedure well with no immediate complications  Dressing:  Sterile dressing applied           Scribe Attestation      I,:  Milagro Delgado am acting as a scribe while in the presence of the attending physician.:       I,:  Rashel Egan MD personally performed the services described in this documentation    as scribed in my presence.:

## 2024-06-01 DIAGNOSIS — N40.1 BENIGN PROSTATIC HYPERPLASIA WITH NOCTURIA: ICD-10-CM

## 2024-06-01 DIAGNOSIS — R35.1 BENIGN PROSTATIC HYPERPLASIA WITH NOCTURIA: ICD-10-CM

## 2024-06-04 RX ORDER — ALFUZOSIN HYDROCHLORIDE 10 MG/1
10 TABLET, EXTENDED RELEASE ORAL DAILY
Qty: 90 TABLET | Refills: 3 | Status: SHIPPED | OUTPATIENT
Start: 2024-06-04

## 2024-08-13 ENCOUNTER — OFFICE VISIT (OUTPATIENT)
Dept: URGENT CARE | Age: 64
End: 2024-08-13
Payer: COMMERCIAL

## 2024-08-13 VITALS
HEART RATE: 76 BPM | TEMPERATURE: 97.6 F | WEIGHT: 182.1 LBS | OXYGEN SATURATION: 99 % | RESPIRATION RATE: 20 BRPM | BODY MASS INDEX: 29.39 KG/M2

## 2024-08-13 DIAGNOSIS — L23.7 POISON IVY: Primary | ICD-10-CM

## 2024-08-13 DIAGNOSIS — L08.9 LOCAL INFECTION OF THE SKIN AND SUBCUTANEOUS TISSUE, UNSPECIFIED: ICD-10-CM

## 2024-08-13 PROCEDURE — S9083 URGENT CARE CENTER GLOBAL: HCPCS | Performed by: PHYSICIAN ASSISTANT

## 2024-08-13 PROCEDURE — G0382 LEV 3 HOSP TYPE B ED VISIT: HCPCS | Performed by: PHYSICIAN ASSISTANT

## 2024-08-13 RX ORDER — CEPHALEXIN 500 MG/1
500 CAPSULE ORAL EVERY 6 HOURS SCHEDULED
Qty: 28 CAPSULE | Refills: 0 | Status: SHIPPED | OUTPATIENT
Start: 2024-08-13 | End: 2024-08-20

## 2024-08-13 NOTE — PROGRESS NOTES
[facility]        NAME: Trey Presley is a 63 y.o. male  : 1960    MRN: [id number]  DATE: 2024  TIME: 10:41 AM    Assessment and Plan   Poison ivy [L23.7]  1. Poison ivy  cephalexin (KEFLEX) 500 mg capsule      2. Local infection of the skin and subcutaneous tissue, unspecified  cephalexin (KEFLEX) 500 mg capsule        Presents with poison ivy dermatitis.  He was recently on steroids do not recommend a secondary course.  Recommend continued Benadryl.  Patient will continue to use triamcinolone ointment as needed.  He is also provided with a prescription for Keflex as it appears that he is getting secondary skin infection from scratching.  We discussed that poison ivy can have a delayed hypersensitivity reaction which is what I believe he is experiencing at this time.  We discussed signs and symptoms of anaphylaxis and ER precautions were given..      Patient Instructions     Patient Instructions   Continue Benadryl as needed.  May take up to 300 mg in 24-hour period.  Continue use triamcinolone ointment as needed.  Do not apply to face or genitals.  May use for up to 2 weeks.  Keflex as prescribed.  If symptoms or not improved in 2 to 3 days follow-up with PCP.  If symptoms worsen or new symptoms develop report to the emergency room immediately.    Follow up with PCP in 3-5 days.  Proceed to  ER if symptoms worsen.    If tests have been performed at [facility], our office will contact you with results if changes need to be made to the care plan discussed with you at the visit. [facility]     Chief Complaint     Chief Complaint   Patient presents with    Rash     Patient  reports came in contact with poison ivy on Saturday . B/l arms and leg and abdomen.          History of Present Illness       63-year-old male presents with concern for poison ivy dermatitis.  Patient reports that he got into poison ivy on Saturday he was started on  triamcinolone ointment and steroids per his PCP but he feels like the rash is continued to spread as he is having on his hip and on his back as well.  Patient notes that it is intensely itchy he has been taking Benadryl with mild relief.    Rash        Review of Systems   Review of Systems   Skin:  Positive for rash.         Current Medications       Current Outpatient Medications:     alfuzosin (UROXATRAL) 10 mg 24 hr tablet, TAKE 1 TABLET (10 MG TOTAL) BY MOUTH DAILY, Disp: 90 tablet, Rfl: 3    amoxicillin-clavulanate (AUGMENTIN) 875-125 mg per tablet, TAKE 1 TABLET BY MOUTH TWICE A DAY UNTIL FINISHED, Disp: , Rfl: 0    Azelastine HCl 137 MCG/SPRAY SOLN, INSTILL 2 SPRAYS INTO EACH NOSTRIL TWICE A DAY, Disp: , Rfl: 1    cefuroxime (CEFTIN) 500 mg tablet, TAKE 1 TABLET BY MOUTH TWICE A DAY FOR 7 DAYS, Disp: , Rfl: 0    cephalexin (KEFLEX) 500 mg capsule, Take 1 capsule (500 mg total) by mouth every 6 (six) hours for 7 days, Disp: 28 capsule, Rfl: 0    [employer] ALLERGY RELIEF D  MG per tablet, TAKE 1 TABLET BY MOUTH IN THE MORNING FOR 10 DAYS, Disp: , Rfl: 0    fluticasone (FLONASE) 50 mcg/act nasal spray, 2 sprays daily, Disp: , Rfl: 1    hydrocortisone (ANUSOL-HC, PROCTOSOL HC,) 2.5 % rectal cream, Insert into the rectum 2 (two) times a day as needed for hemorrhoids, Disp: 30 g, Rfl: 5    meclizine (ANTIVERT) 12.5 MG tablet, Take 12.5 mg by mouth if needed, Disp: , Rfl: 0    Meclizine HCl 25 MG CHEW, Chew 1 tablet every 8 (eight) hours as needed, Disp: , Rfl:     methocarbamol (ROBAXIN) 500 mg tablet, Take 1 tablet (500 mg total) by mouth 3 (three) times a day, Disp: 30 tablet, Rfl: 0    multivitamin (THERAGRAN) TABS, Take 1 tablet by mouth daily, Disp: , Rfl:     naproxen (NAPROSYN) 500 mg tablet, Take 1 tablet (500 mg total) by mouth 2 (two) times a day with meals, Disp: 30 tablet, Rfl: 0    naproxen (NAPROSYN) 500 mg tablet, Take 1 tablet (500 mg total) by mouth 2 (two) times a day with meals, Disp: 30 tablet,  Rfl: 0    Omega-3 Fatty Acids (FISH OIL CONCENTRATE PO), Take by mouth, Disp: , Rfl:     omeprazole (PriLOSEC) 20 mg delayed release capsule, Take 20 mg by mouth every other day, Disp: , Rfl:     predniSONE 20 mg tablet, TAKE 2 TABLETS EVERY DAY FOR 3 DAYS THEN TAKE 1 TABLET EVERY DAY FOR 4 DAYS, Disp: , Rfl: 0    rosuvastatin (CRESTOR) 10 MG tablet, Take 10 mg by mouth daily, Disp: , Rfl:     Saw Palmetto, Serenoa repens, 450 MG CAPS, Take by mouth, Disp: , Rfl:     tadalafil (CIALIS) 20 MG tablet, Take 1 tablet (20 mg total) by mouth daily as needed for erectile dysfunction, Disp: 10 tablet, Rfl: 3    ibuprofen (MOTRIN) 600 mg tablet, Take 1 tablet (600 mg total) by mouth every 8 (eight) hours for 10 days, Disp: 30 tablet, Rfl: 0    Current Allergies     Allergies as of 08/13/2024    (No Known Allergies)            The following portions of the patient's history were reviewed and updated as appropriate: allergies, current medications, past family history, past medical history, past social history, past surgical history and problem list.     Past Medical History:   Diagnosis Date    BPH (benign prostatic hyperplasia)     GERD (gastroesophageal reflux disease)     Hyperlipidemia        Past Surgical History:   Procedure Laterality Date    COLONOSCOPY      ESOPHAGOGASTRODUODENOSCOPY      [place] COLONOSCOPY FLX DX W/COLLJ SPEC WHEN PFRMD N/A 2/13/2017    Procedure: COLONOSCOPY;  Surgeon: Ida Marsh MD;  [facility];  Service: Gastroenterology       Family History   Problem Relation Age of Onset    Breast cancer Mother     Diabetes Mother     Hypertension Mother     Lung cancer Mother          Medications have been verified.        Objective   Pulse 76   Temp 97.6 °F (36.4 °C) (Temporal)   Resp 20   Wt 82.6 kg (182 lb 1.6 oz)   SpO2 99%   BMI 29.39 kg/m²   No LMP for male patient.       Physical Exam     Physical Exam  Constitutional:       General: He is awake. He is not in acute distress.     Appearance:  "Normal appearance. He is well-developed and well-groomed. He is not ill-appearing, toxic-appearing or diaphoretic.   HENT:      Head: Normocephalic and atraumatic.      Right Ear: Hearing and external ear normal.      Left Ear: Hearing and external ear normal.   Eyes:      General: Lids are normal. Vision grossly intact. Gaze aligned appropriately.   Cardiovascular:      Rate and Rhythm: Normal rate.   Pulmonary:      Effort: Pulmonary effort is normal.      Comments: Patient is speaking in full sentences with no increased respiratory effort. No audible wheezing or stridor.   Musculoskeletal:      Cervical back: Normal range of motion.   Skin:     General: Skin is warm and dry.      Comments: Large hive to the right hip.  There is raised erythematous rash to the left scapular region as well.  Bilateral legs with raised erythematous plaque with excoriations and warmth concerning for skin infection.   Neurological:      Mental Status: He is alert and oriented to person, place, and time.      Coordination: Coordination is intact.      Gait: Gait is intact.   Psychiatric:         Attention and Perception: Attention and perception normal.         Mood and Affect: Mood and affect normal.         Speech: Speech normal.         Behavior: Behavior normal. Behavior is cooperative.               Note: Portions of this record may have been created with voice recognition software. Occasional wrong word or \"sound a like\" substitutions may have occurred due to the inherent limitations of voice recognition software. Please read the chart carefully and recognize, using context, where substitutions have occurred.*      "

## 2024-08-13 NOTE — LETTER
August 13, 2024     Patient: Trey Presley   YOB: 1960   Date of Visit: 8/13/2024       To Whom It May Concern:    It is my medical opinion that Trey Presley may return to work on 08/14/2024 .    If you have any questions or concerns, please don't hesitate to call.         Sincerely,        Cristina Flores PA-C    CC: No Recipients

## 2024-08-13 NOTE — PATIENT INSTRUCTIONS
Continue Benadryl as needed.  May take up to 300 mg in 24-hour period.  Continue use triamcinolone ointment as needed.  Do not apply to face or genitals.  May use for up to 2 weeks.  Keflex as prescribed.  If symptoms or not improved in 2 to 3 days follow-up with PCP.  If symptoms worsen or new symptoms develop report to the emergency room immediately.

## 2024-08-15 ENCOUNTER — OFFICE VISIT (OUTPATIENT)
Dept: URGENT CARE | Age: 64
End: 2024-08-15
Payer: COMMERCIAL

## 2024-08-15 VITALS
SYSTOLIC BLOOD PRESSURE: 143 MMHG | DIASTOLIC BLOOD PRESSURE: 89 MMHG | RESPIRATION RATE: 16 BRPM | OXYGEN SATURATION: 97 % | TEMPERATURE: 98 F | HEART RATE: 75 BPM

## 2024-08-15 DIAGNOSIS — L23.7 CONTACT DERMATITIS DUE TO POISON IVY: Primary | ICD-10-CM

## 2024-08-15 DIAGNOSIS — N52.9 ERECTILE DYSFUNCTION, UNSPECIFIED ERECTILE DYSFUNCTION TYPE: ICD-10-CM

## 2024-08-15 PROCEDURE — G0381 LEV 2 HOSP TYPE B ED VISIT: HCPCS

## 2024-08-15 PROCEDURE — S9083 URGENT CARE CENTER GLOBAL: HCPCS

## 2024-08-15 RX ORDER — TADALAFIL 20 MG/1
20 TABLET ORAL DAILY PRN
Qty: 10 TABLET | Refills: 5 | Status: SHIPPED | OUTPATIENT
Start: 2024-08-15

## 2024-08-15 NOTE — LETTER
August 15, 2024     Patient: Trey Presley   YOB: 1960   Date of Visit: 8/15/2024       To Whom it May Concern:    Trey Presley was seen in my clinic on 8/15/2024. He may return to work on 8/16/2024 .    If you have any questions or concerns, please don't hesitate to call.         Sincerely,          GARFIELD Winslow        CC: No Recipients

## 2024-08-15 NOTE — PROGRESS NOTES
[facility]        NAME: Trey Presley is a 63 y.o. male  : 1960    MRN: [id number]  DATE: August 15, 2024  TIME: 10:33 AM    Assessment and Plan   Contact dermatitis due to poison ivy [L23.7]  1. Contact dermatitis due to poison ivy          Bilateral lower legs without redness or drainage, cellulitis appears to be improving on course of keflex.  Increased itching to trunk started prior to initiation of keflex.  Will trial adult otc benadryl or allegra for itching.    Continue keflex as rx.  Work note porvided.     Patient Instructions       Follow up with PCP in 3-5 days.  Proceed to  ER if symptoms worsen.    If tests have been performed at [facility], our office will contact you with results if changes need to be made to the care plan discussed with you at the visit.  [facility]    Chief Complaint     Chief Complaint   Patient presents with   • Rash     Patient was here Tuesday and is having restlessness from his prednisone. He has hives all over the body and notes slight improvement but is feeling extremely wired. Patient is here for excuse as well.          History of Present Illness       Patient is a 63-year-old male presenting with 10 days of poison ivy like rash to bilateral upper and lower extremities, and trunk.  States that he has course of prednisone taper, and has been taking antibiotics as directed.  Presents today requesting work excuse, to return to work on .  Patient concerns with the Benadryl keeping him up at night, stating he only has irritation and itchiness at bedtime.    Rash  Pertinent negatives include no fever.       Review of Systems   Review of Systems   Constitutional:  Negative for chills and fever.   Skin:  Positive for rash. Negative for color change and wound.         Current Medications       Current Outpatient Medications:   •  alfuzosin (UROXATRAL) 10 mg 24 hr tablet, TAKE 1 TABLET (10 MG TOTAL) BY MOUTH DAILY,  Disp: 90 tablet, Rfl: 3  •  amoxicillin-clavulanate (AUGMENTIN) 875-125 mg per tablet, TAKE 1 TABLET BY MOUTH TWICE A DAY UNTIL FINISHED, Disp: , Rfl: 0  •  Azelastine HCl 137 MCG/SPRAY SOLN, INSTILL 2 SPRAYS INTO EACH NOSTRIL TWICE A DAY, Disp: , Rfl: 1  •  cefuroxime (CEFTIN) 500 mg tablet, TAKE 1 TABLET BY MOUTH TWICE A DAY FOR 7 DAYS, Disp: , Rfl: 0  •  cephalexin (KEFLEX) 500 mg capsule, Take 1 capsule (500 mg total) by mouth every 6 (six) hours for 7 days, Disp: 28 capsule, Rfl: 0  •  [employer]S ALLERGY RELIEF D  MG per tablet, TAKE 1 TABLET BY MOUTH IN THE MORNING FOR 10 DAYS, Disp: , Rfl: 0  •  fluticasone (FLONASE) 50 mcg/act nasal spray, 2 sprays daily, Disp: , Rfl: 1  •  hydrocortisone (ANUSOL-HC, PROCTOSOL HC,) 2.5 % rectal cream, Insert into the rectum 2 (two) times a day as needed for hemorrhoids, Disp: 30 g, Rfl: 5  •  meclizine (ANTIVERT) 12.5 MG tablet, Take 12.5 mg by mouth if needed, Disp: , Rfl: 0  •  Meclizine HCl 25 MG CHEW, Chew 1 tablet every 8 (eight) hours as needed, Disp: , Rfl:   •  methocarbamol (ROBAXIN) 500 mg tablet, Take 1 tablet (500 mg total) by mouth 3 (three) times a day, Disp: 30 tablet, Rfl: 0  •  multivitamin (THERAGRAN) TABS, Take 1 tablet by mouth daily, Disp: , Rfl:   •  naproxen (NAPROSYN) 500 mg tablet, Take 1 tablet (500 mg total) by mouth 2 (two) times a day with meals, Disp: 30 tablet, Rfl: 0  •  naproxen (NAPROSYN) 500 mg tablet, Take 1 tablet (500 mg total) by mouth 2 (two) times a day with meals, Disp: 30 tablet, Rfl: 0  •  Omega-3 Fatty Acids (FISH OIL CONCENTRATE PO), Take by mouth, Disp: , Rfl:   •  omeprazole (PriLOSEC) 20 mg delayed release capsule, Take 20 mg by mouth every other day, Disp: , Rfl:   •  predniSONE 20 mg tablet, TAKE 2 TABLETS EVERY DAY FOR 3 DAYS THEN TAKE 1 TABLET EVERY DAY FOR 4 DAYS, Disp: , Rfl: 0  •  rosuvastatin (CRESTOR) 10 MG tablet, Take 10 mg by mouth daily, Disp: , Rfl:   •  Saw Palmetto, Serenoa repens, 450 MG CAPS, Take by mouth,  Disp: , Rfl:   •  ibuprofen (MOTRIN) 600 mg tablet, Take 1 tablet (600 mg total) by mouth every 8 (eight) hours for 10 days, Disp: 30 tablet, Rfl: 0  •  tadalafil (CIALIS) 20 MG tablet, Take 1 tablet (20 mg total) by mouth daily as needed for erectile dysfunction, Disp: 10 tablet, Rfl: 3    Current Allergies     Allergies as of 08/15/2024   • (No Known Allergies)            The following portions of the patient's history were reviewed and updated as appropriate: allergies, current medications, past family history, past medical history, past social history, past surgical history and problem list.     Past Medical History:   Diagnosis Date   • BPH (benign prostatic hyperplasia)    • GERD (gastroesophageal reflux disease)    • Hyperlipidemia        Past Surgical History:   Procedure Laterality Date   • COLONOSCOPY     • ESOPHAGOGASTRODUODENOSCOPY     • [place] COLONOSCOPY FLX DX W/COLLJ SPEC WHEN PFRMD N/A 2/13/2017    Procedure: COLONOSCOPY;  Surgeon: Ida Marsh MD;  [facility];  Service: Gastroenterology       Family History   Problem Relation Age of Onset   • Breast cancer Mother    • Diabetes Mother    • Hypertension Mother    • Lung cancer Mother          Medications have been verified.        Objective   /89   Pulse 75   Temp 98 °F (36.7 °C)   Resp 16   SpO2 97%   No LMP for male patient.       Physical Exam     Physical Exam  Vitals and nursing note reviewed.   Constitutional:       General: He is not in acute distress.     Appearance: Normal appearance. He is normal weight.   Eyes:      Extraocular Movements: Extraocular movements intact.   Cardiovascular:      Rate and Rhythm: Normal rate.   Pulmonary:      Effort: Pulmonary effort is normal. No respiratory distress.   Skin:     General: Skin is warm and dry.      Capillary Refill: Capillary refill takes less than 2 seconds.      Findings: Rash present. Rash is macular and vesicular.          Neurological:      Mental Status: He is alert.

## 2024-08-15 NOTE — PATIENT INSTRUCTIONS
Monitor site for increased redness, change in rash, increased swelling, or drainage.     If this develops or you develop any fever, chills, aches, joint pain, swelling, headache, dizziness, numbness or any new or concerning symptoms please proceed to ER.    Take Prednisone as directed.  Do not take any additional motrin/advil/ibuprofen while on the prednisone.  You may take additional tylenol as needed for pain.     Good hand hygiene  Avoid scratching area  Keep area clean and dry    Watch for signs of increased infection as previously discussed    If you develop any facial swelling, shortness of breath, difficulty breathing or any new or concerning symptoms please return or proceed ER.     Follow up with PCP in 3-5 days.

## 2024-08-15 NOTE — TELEPHONE ENCOUNTER
Reason for call:   [x] Refill   [] Prior Auth  [] Other:     Office:   [] PCP/Provider -   [x] Specialty/Provider -  GARFIEDL Jeong -[facility]     Medication:     tadalafil (CIALIS) 20 MG tablet         Take 1 tablet (20 mg total) by mouth daily as needed for erectile dysfunction       Pharmacy: : [address]     Does the patient have enough for 3 days?   [x] Yes   [] No - Send as HP to POD

## 2024-08-21 ENCOUNTER — OFFICE VISIT (OUTPATIENT)
Dept: OBGYN CLINIC | Facility: HOSPITAL | Age: 64
End: 2024-08-21
Payer: COMMERCIAL

## 2024-08-21 ENCOUNTER — PREP FOR PROCEDURE (OUTPATIENT)
Dept: OBGYN CLINIC | Facility: HOSPITAL | Age: 64
End: 2024-08-21

## 2024-08-21 VITALS
BODY MASS INDEX: 29.51 KG/M2 | WEIGHT: 183.6 LBS | SYSTOLIC BLOOD PRESSURE: 145 MMHG | DIASTOLIC BLOOD PRESSURE: 86 MMHG | HEART RATE: 88 BPM | HEIGHT: 66 IN

## 2024-08-21 DIAGNOSIS — M67.441 GANGLION CYST OF FINGER OF RIGHT HAND: Primary | ICD-10-CM

## 2024-08-21 PROCEDURE — 99214 OFFICE O/P EST MOD 30 MIN: CPT | Performed by: ORTHOPAEDIC SURGERY

## 2024-08-21 RX ORDER — LIDOCAINE HYDROCHLORIDE AND EPINEPHRINE 10; 10 MG/ML; UG/ML
20 INJECTION, SOLUTION INFILTRATION; PERINEURAL ONCE
OUTPATIENT
Start: 2024-08-21 | End: 2024-08-21

## 2024-08-21 NOTE — PROGRESS NOTES
ASSESSMENT/PLAN:    Assessment:   Dorsal ganglion cyst  right ring finger DIP    Plan:   Conservative and operative treatments were discussed with the patient at length  He would like to proceed with surgical intervention  Detailed consent was obtained for right ring finger mucoid cyst excision under local anesthesia  Risk and benefits of surgery were discussed  He will follow-up after surgery for suture removal    Follow Up:  After surgery  To Do Next Visit:  Sutures out    General Discussions:  Ganglion Cysts: The anatomy and physiology of the ganglion was discussed with the patient today in the office.  Fluid leaking out of the joint surface typically creates a small sac, which can enlarge and cause pain or limitation of motion.  Treatment options include observation, aspiration, or surgical incision were discussed with the patient today.  Observation typically lead to resolution and approximately 10% of patients, aspiration least resolution approximately 50% of patients, and surgical excision lead to resolution in approximately 97% of patients.  After discussion with the patient today, the patient voiced understanding of all treatment options.    Operative Discussions:      _____________________________________________________  CHIEF COMPLAINT:  Chief Complaint   Patient presents with    Right Ring Finger - Cyst         SUBJECTIVE:  Trey Presley is a 63 y.o. male who presents for follow-up regarding right ring finger mucoid cyst.  He previously had a cyst drained at his office visit.  He states that the cyst started to return and is painful over the finger when trying to use it.  He states that he would like to proceed with surgical intervention at this time    PAST MEDICAL HISTORY:  Past Medical History:   Diagnosis Date    BPH (benign prostatic hyperplasia)     GERD (gastroesophageal reflux disease)     Hyperlipidemia        PAST SURGICAL HISTORY:  Past Surgical History:   Procedure Laterality Date     COLONOSCOPY      ESOPHAGOGASTRODUODENOSCOPY      [place] COLONOSCOPY FLX DX W/COLLJ SPEC WHEN PFRMD N/A 2017    Procedure: COLONOSCOPY;  Surgeon: Ida Marsh MD;  [facility];  Service: Gastroenterology       FAMILY HISTORY:  Family History   Problem Relation Age of Onset    Breast cancer Mother     Diabetes Mother     Hypertension Mother     Lung cancer Mother        SOCIAL HISTORY:  Social History     Tobacco Use    Smoking status: Former     Current packs/day: 0.00     Types: Cigarettes     Quit date: 2017     Years since quittin.1     Passive exposure: Past    Smokeless tobacco: Never   Vaping Use    Vaping status: Never Used   Substance Use Topics    Alcohol use: Yes     Comment: social    Drug use: No       MEDICATIONS:    Current Outpatient Medications:     alfuzosin (UROXATRAL) 10 mg 24 hr tablet, TAKE 1 TABLET (10 MG TOTAL) BY MOUTH DAILY, Disp: 90 tablet, Rfl: 3    Azelastine HCl 137 MCG/SPRAY SOLN, INSTILL 2 SPRAYS INTO EACH NOSTRIL TWICE A DAY, Disp: , Rfl: 1    [employer]S ALLERGY RELIEF D  MG per tablet, TAKE 1 TABLET BY MOUTH IN THE MORNING FOR 10 DAYS, Disp: , Rfl: 0    fluticasone (FLONASE) 50 mcg/act nasal spray, 2 sprays daily, Disp: , Rfl: 1    ibuprofen (MOTRIN) 600 mg tablet, Take 1 tablet (600 mg total) by mouth every 8 (eight) hours for 10 days, Disp: 30 tablet, Rfl: 0    meclizine (ANTIVERT) 12.5 MG tablet, Take 12.5 mg by mouth if needed, Disp: , Rfl: 0    multivitamin (THERAGRAN) TABS, Take 1 tablet by mouth daily, Disp: , Rfl:     Omega-3 Fatty Acids (FISH OIL CONCENTRATE PO), Take by mouth, Disp: , Rfl:     omeprazole (PriLOSEC) 20 mg delayed release capsule, Take 20 mg by mouth every other day, Disp: , Rfl:     rosuvastatin (CRESTOR) 10 MG tablet, Take 10 mg by mouth daily, Disp: , Rfl:     Saw Palmettkarime Serenoa repens, 450 MG CAPS, Take by mouth, Disp: , Rfl:     tadalafil (CIALIS) 20 MG tablet, Take 1 tablet (20 mg total) by mouth daily as needed for erectile  "dysfunction, Disp: 10 tablet, Rfl: 5    amoxicillin-clavulanate (AUGMENTIN) 875-125 mg per tablet, TAKE 1 TABLET BY MOUTH TWICE A DAY UNTIL FINISHED, Disp: , Rfl: 0    cefuroxime (CEFTIN) 500 mg tablet, TAKE 1 TABLET BY MOUTH TWICE A DAY FOR 7 DAYS, Disp: , Rfl: 0    hydrocortisone (ANUSOL-HC, PROCTOSOL HC,) 2.5 % rectal cream, Insert into the rectum 2 (two) times a day as needed for hemorrhoids, Disp: 30 g, Rfl: 5    Meclizine HCl 25 MG CHEW, Chew 1 tablet every 8 (eight) hours as needed, Disp: , Rfl:     methocarbamol (ROBAXIN) 500 mg tablet, Take 1 tablet (500 mg total) by mouth 3 (three) times a day, Disp: 30 tablet, Rfl: 0    naproxen (NAPROSYN) 500 mg tablet, Take 1 tablet (500 mg total) by mouth 2 (two) times a day with meals, Disp: 30 tablet, Rfl: 0    naproxen (NAPROSYN) 500 mg tablet, Take 1 tablet (500 mg total) by mouth 2 (two) times a day with meals, Disp: 30 tablet, Rfl: 0    predniSONE 20 mg tablet, TAKE 2 TABLETS EVERY DAY FOR 3 DAYS THEN TAKE 1 TABLET EVERY DAY FOR 4 DAYS, Disp: , Rfl: 0    ALLERGIES:  No Known Allergies    REVIEW OF SYSTEMS:  Pertinent items are noted in HPI.  A comprehensive review of systems was negative.    LABS:  HgA1c: No results found for: \"HGBA1C\"  BMP:   Lab Results   Component Value Date    CALCIUM 9.9 01/31/2024    K 4.4 01/31/2024    CO2 31 01/31/2024     01/31/2024    BUN 13 01/31/2024    CREATININE 0.96 01/31/2024         _____________________________________________________  PHYSICAL EXAMINATION:  Vital signs: /86   Pulse 88   Ht 5' 6\" (1.676 m)   Wt 83.3 kg (183 lb 9.6 oz)   BMI 29.63 kg/m²   General: well developed and well nourished, alert, oriented times 3, and appears comfortable  Psychiatric: Normal  HEENT: Trachea Midline, No torticollis  Cardiovascular: No discernable arrhythmia  Pulmonary: No wheezing or stridor  Abdomen: No rebound or guarding  Extremities: No peripheral edema  Skin: No masses, erythema, lacerations, fluctation, ulcerations, " Ganglion cyst present at dorsal right ring finger  Neurovascular: Sensation Intact to the Median, Ulnar, Radial Nerve, Motor Intact to the Median, Ulnar, Radial Nerve, and Pulses Intact    MUSCULOSKELETAL EXAMINATION:  right Hand -ring finger  Mucoid cyst appreciated over the dorsal distal phalanx on the radial side  Skin is somewhat translucent  Tenderness to palpation over the cyst  No visible deformity appreciated in the nail  He has full range of motion with flexion extension of the digit  Sensation is intact to light touch      _____________________________________________________  STUDIES REVIEWED:  No Studies to review      PROCEDURES PERFORMED:  Procedures  No additional procedures were performed at today's visit      Scribe Attestation      I,:  Nicholas Godfrey PA-C am acting as a scribe while in the presence of the attending physician.:       I,:  Rashel Egan MD personally performed the services described in this documentation    as scribed in my presence.:

## 2024-09-18 ENCOUNTER — TELEPHONE (OUTPATIENT)
Dept: OBGYN CLINIC | Facility: MEDICAL CENTER | Age: 64
End: 2024-09-18

## 2024-09-18 NOTE — TELEPHONE ENCOUNTER
Caller: Trey    Doctor: Dr Egan     Reason for call: The patient called to cancel his surgery on 10/1 with Dr Egan and also his PO on 10/10.  Ganglion cyst of finger of right hand   He will call back when he is ready for surgery.    Call back#: [phone]     Thank you

## 2024-09-19 ENCOUNTER — APPOINTMENT (OUTPATIENT)
Dept: LAB | Age: 64
End: 2024-09-19
Payer: COMMERCIAL

## 2024-09-19 DIAGNOSIS — R53.83 OTHER FATIGUE: ICD-10-CM

## 2024-09-19 DIAGNOSIS — Z12.5 SCREENING FOR PROSTATE CANCER: ICD-10-CM

## 2024-09-19 LAB
ALBUMIN SERPL BCG-MCNC: 4.3 G/DL (ref 3.5–5)
ALP SERPL-CCNC: 96 U/L (ref 34–104)
ALT SERPL W P-5'-P-CCNC: 24 U/L (ref 7–52)
ANION GAP SERPL CALCULATED.3IONS-SCNC: 6 MMOL/L (ref 4–13)
AST SERPL W P-5'-P-CCNC: 24 U/L (ref 13–39)
BASOPHILS # BLD AUTO: 0.05 THOUSANDS/ΜL (ref 0–0.1)
BASOPHILS NFR BLD AUTO: 1 % (ref 0–1)
BILIRUB SERPL-MCNC: 0.59 MG/DL (ref 0.2–1)
BUN SERPL-MCNC: 14 MG/DL (ref 5–25)
CALCIUM SERPL-MCNC: 9.7 MG/DL (ref 8.4–10.2)
CHLORIDE SERPL-SCNC: 104 MMOL/L (ref 96–108)
CHOLEST SERPL-MCNC: 194 MG/DL
CO2 SERPL-SCNC: 30 MMOL/L (ref 21–32)
CREAT SERPL-MCNC: 0.93 MG/DL (ref 0.6–1.3)
EOSINOPHIL # BLD AUTO: 0.1 THOUSAND/ΜL (ref 0–0.61)
EOSINOPHIL NFR BLD AUTO: 2 % (ref 0–6)
ERYTHROCYTE [DISTWIDTH] IN BLOOD BY AUTOMATED COUNT: 15.2 % (ref 11.6–15.1)
GFR SERPL CREATININE-BSD FRML MDRD: 86 ML/MIN/1.73SQ M
GLUCOSE P FAST SERPL-MCNC: 92 MG/DL (ref 65–99)
HCT VFR BLD AUTO: 50.7 % (ref 36.5–49.3)
HDLC SERPL-MCNC: 33 MG/DL
HGB BLD-MCNC: 16.2 G/DL (ref 12–17)
IMM GRANULOCYTES # BLD AUTO: 0.02 THOUSAND/UL (ref 0–0.2)
IMM GRANULOCYTES NFR BLD AUTO: 0 % (ref 0–2)
LDLC SERPL CALC-MCNC: 127 MG/DL (ref 0–100)
LYMPHOCYTES # BLD AUTO: 1.52 THOUSANDS/ΜL (ref 0.6–4.47)
LYMPHOCYTES NFR BLD AUTO: 34 % (ref 14–44)
MCH RBC QN AUTO: 27.4 PG (ref 26.8–34.3)
MCHC RBC AUTO-ENTMCNC: 32 G/DL (ref 31.4–37.4)
MCV RBC AUTO: 86 FL (ref 82–98)
MONOCYTES # BLD AUTO: 0.45 THOUSAND/ΜL (ref 0.17–1.22)
MONOCYTES NFR BLD AUTO: 10 % (ref 4–12)
NEUTROPHILS # BLD AUTO: 2.4 THOUSANDS/ΜL (ref 1.85–7.62)
NEUTS SEG NFR BLD AUTO: 53 % (ref 43–75)
NONHDLC SERPL-MCNC: 161 MG/DL
NRBC BLD AUTO-RTO: 0 /100 WBCS
PLATELET # BLD AUTO: 193 THOUSANDS/UL (ref 149–390)
PMV BLD AUTO: 12.1 FL (ref 8.9–12.7)
POTASSIUM SERPL-SCNC: 4.8 MMOL/L (ref 3.5–5.3)
PROT SERPL-MCNC: 6.9 G/DL (ref 6.4–8.4)
PSA SERPL-MCNC: 1.9 NG/ML (ref 0–4)
RBC # BLD AUTO: 5.92 MILLION/UL (ref 3.88–5.62)
SODIUM SERPL-SCNC: 140 MMOL/L (ref 135–147)
T3 SERPL-MCNC: 0.9 NG/ML
T4 FREE SERPL-MCNC: 0.67 NG/DL (ref 0.61–1.12)
TRIGL SERPL-MCNC: 168 MG/DL
TSH SERPL DL<=0.05 MIU/L-ACNC: 0.92 UIU/ML (ref 0.45–4.5)
WBC # BLD AUTO: 4.54 THOUSAND/UL (ref 4.31–10.16)

## 2024-09-19 PROCEDURE — 80053 COMPREHEN METABOLIC PANEL: CPT

## 2024-09-19 PROCEDURE — G0103 PSA SCREENING: HCPCS

## 2024-09-19 PROCEDURE — 84439 ASSAY OF FREE THYROXINE: CPT

## 2024-09-19 PROCEDURE — 36415 COLL VENOUS BLD VENIPUNCTURE: CPT

## 2024-09-19 PROCEDURE — 84480 ASSAY TRIIODOTHYRONINE (T3): CPT

## 2024-09-19 PROCEDURE — 80061 LIPID PANEL: CPT

## 2024-09-19 PROCEDURE — 84443 ASSAY THYROID STIM HORMONE: CPT

## 2024-09-19 PROCEDURE — 85025 COMPLETE CBC W/AUTO DIFF WBC: CPT

## 2024-10-09 ENCOUNTER — OFFICE VISIT (OUTPATIENT)
Dept: PODIATRY | Facility: CLINIC | Age: 64
End: 2024-10-09
Payer: COMMERCIAL

## 2024-10-09 DIAGNOSIS — B35.1 ONYCHOMYCOSIS: Primary | ICD-10-CM

## 2024-10-09 DIAGNOSIS — M79.672 PAIN IN BOTH FEET: ICD-10-CM

## 2024-10-09 DIAGNOSIS — M79.671 PAIN IN BOTH FEET: ICD-10-CM

## 2024-10-09 PROCEDURE — 99213 OFFICE O/P EST LOW 20 MIN: CPT | Performed by: PODIATRIST

## 2024-10-09 RX ORDER — CICLOPIROX 80 MG/ML
SOLUTION TOPICAL DAILY
Qty: 6 ML | Refills: 5 | Status: SHIPPED | OUTPATIENT
Start: 2024-10-09 | End: 2025-04-07

## 2024-10-09 NOTE — PROGRESS NOTES
Ambulatory Visit  Name: Trey Presley      : 1960      MRN: [id number]  Encounter Provider: Po Barrios DPM  Encounter Date: 10/9/2024   Encounter department: [facility] [place]    Assessment & Plan  Onychomycosis    Orders:    ciclopirox (PENLAC) 8 % solution; Apply topically daily  Patient was explained the two forms of treatment for mycotic nails.  The topical (Penlac, OTC antifungal) needs to be applied twice a day for then next 6 to 9 months if done once a day it will double the time.  The other form is oral medication (Lamisil/terbinafine) is 1 pill a day for 3 months then finished.  They were told that with any of the these treatments it will take care of the fungal it will not necessary change the  thickness, coloration, and shape.   Showed the patient how to apply the topical antifungal.  He was told to go all the way around the nail where over the nail touches the skin and lastly do the nail plate area but not to concentrate on that area.  Debride mycotic nails and thin the nail plates x 4 with the use of a nail nipper manually and electric Dremel bur was used to reduce the bulk of the nail plate.  And smoothed the distal edge of the nail.  Was done to allow the nails to more readily absorbed the topical antifungal.  Pain in both feet         Return in about 3 months (around 2025).     History of Present Illness     Trey Presley is a 64 y.o. male who presents with chief complaint of painful thick nails on both feet and is here today to look for treatment for the mycotic nails.    History obtained from : patient  Review of Systems  Medical History Reviewed by provider this encounter:       Current Outpatient Medications on File Prior to Visit   Medication Sig Dispense Refill    alfuzosin (UROXATRAL) 10 mg 24 hr tablet TAKE 1 TABLET (10 MG TOTAL) BY MOUTH DAILY 90 tablet 3    amoxicillin-clavulanate (AUGMENTIN) 875-125 mg per tablet TAKE 1 TABLET BY MOUTH TWICE A DAY UNTIL  FINISHED  0    Azelastine HCl 137 MCG/SPRAY SOLN INSTILL 2 SPRAYS INTO EACH NOSTRIL TWICE A DAY  1    cefuroxime (CEFTIN) 500 mg tablet TAKE 1 TABLET BY MOUTH TWICE A DAY FOR 7 DAYS  0    [employer] ALLERGY RELIEF D  MG per tablet TAKE 1 TABLET BY MOUTH IN THE MORNING FOR 10 DAYS  0    fluticasone (FLONASE) 50 mcg/act nasal spray 2 sprays daily  1    hydrocortisone (ANUSOL-HC, PROCTOSOL HC,) 2.5 % rectal cream Insert into the rectum 2 (two) times a day as needed for hemorrhoids 30 g 5    ibuprofen (MOTRIN) 600 mg tablet Take 1 tablet (600 mg total) by mouth every 8 (eight) hours for 10 days 30 tablet 0    meclizine (ANTIVERT) 12.5 MG tablet Take 12.5 mg by mouth if needed  0    Meclizine HCl 25 MG CHEW Chew 1 tablet every 8 (eight) hours as needed      methocarbamol (ROBAXIN) 500 mg tablet Take 1 tablet (500 mg total) by mouth 3 (three) times a day 30 tablet 0    multivitamin (THERAGRAN) TABS Take 1 tablet by mouth daily      naproxen (NAPROSYN) 500 mg tablet Take 1 tablet (500 mg total) by mouth 2 (two) times a day with meals 30 tablet 0    naproxen (NAPROSYN) 500 mg tablet Take 1 tablet (500 mg total) by mouth 2 (two) times a day with meals 30 tablet 0    Omega-3 Fatty Acids (FISH OIL CONCENTRATE PO) Take by mouth      omeprazole (PriLOSEC) 20 mg delayed release capsule Take 20 mg by mouth every other day      predniSONE 20 mg tablet TAKE 2 TABLETS EVERY DAY FOR 3 DAYS THEN TAKE 1 TABLET EVERY DAY FOR 4 DAYS  0    rosuvastatin (CRESTOR) 10 MG tablet Take 10 mg by mouth daily      Saw Palmetto, Serenoa repens, 450 MG CAPS Take by mouth      tadalafil (CIALIS) 20 MG tablet Take 1 tablet (20 mg total) by mouth daily as needed for erectile dysfunction 10 tablet 5     No current facility-administered medications on file prior to visit.      Social History     Tobacco Use    Smoking status: Former     Current packs/day: 0.00     Types: Cigarettes     Quit date: 2017     Years since quittin.2     Passive exposure:  Past    Smokeless tobacco: Never   Vaping Use    Vaping status: Never Used   Substance and Sexual Activity    Alcohol use: Yes     Comment: social    Drug use: No    Sexual activity: Yes     Partners: Female         Objective     There were no vitals taken for this visit.    Physical Exam  Vascular status is 2/4 DP PT negative digital hair normal distal cooling immediate capillary refill bilaterally.  Capillary refill is approximately 3 seconds.    Derm nails are brittle elongated hypertrophic white-brown discoloration with subungual debris x 3.  There is an increased thickness in the nails were approximately 2 to 4 mm.  The nails that are involved are the right and left hallux and the fifth digit on the left foot.  The right hallux has the least amount of increased thickness 90% of the nail is clear except for the distal medial border.    Ortho hammertoe deformities in adductovarus position are on the fifth digits bilaterally.    Neuro is intact and equal bilaterally for light touch sensation.  Administrative Statements   I have spent a total time of 15 minutes in caring for this patient on the day of the visit/encounter including Risks and benefits of tx options, Instructions for management, Patient and family education, Importance of tx compliance, Counseling / Coordination of care, Documenting in the medical record, Reviewing / ordering tests, medicine, procedures  , and Obtaining or reviewing history  .

## 2025-03-18 ENCOUNTER — APPOINTMENT (OUTPATIENT)
Dept: LAB | Facility: CLINIC | Age: 65
End: 2025-03-18
Payer: COMMERCIAL

## 2025-03-18 DIAGNOSIS — E78.5 HYPERLIPIDEMIA, UNSPECIFIED HYPERLIPIDEMIA TYPE: ICD-10-CM

## 2025-03-18 DIAGNOSIS — R35.0 URINARY FREQUENCY: ICD-10-CM

## 2025-03-18 DIAGNOSIS — R53.83 OTHER FATIGUE: ICD-10-CM

## 2025-03-18 LAB
ALBUMIN SERPL BCG-MCNC: 4.5 G/DL (ref 3.5–5)
ALP SERPL-CCNC: 92 U/L (ref 34–104)
ALT SERPL W P-5'-P-CCNC: 20 U/L (ref 7–52)
ANION GAP SERPL CALCULATED.3IONS-SCNC: 4 MMOL/L (ref 4–13)
AST SERPL W P-5'-P-CCNC: 19 U/L (ref 13–39)
BASOPHILS # BLD AUTO: 0.05 THOUSANDS/ÂΜL (ref 0–0.1)
BASOPHILS NFR BLD AUTO: 1 % (ref 0–1)
BILIRUB SERPL-MCNC: 0.51 MG/DL (ref 0.2–1)
BILIRUB UR QL STRIP: NEGATIVE
BUN SERPL-MCNC: 12 MG/DL (ref 5–25)
CALCIUM SERPL-MCNC: 9.7 MG/DL (ref 8.4–10.2)
CHLORIDE SERPL-SCNC: 104 MMOL/L (ref 96–108)
CHOLEST SERPL-MCNC: 207 MG/DL (ref ?–200)
CLARITY UR: CLEAR
CO2 SERPL-SCNC: 31 MMOL/L (ref 21–32)
COLOR UR: NORMAL
CREAT SERPL-MCNC: 0.9 MG/DL (ref 0.6–1.3)
EOSINOPHIL # BLD AUTO: 0.06 THOUSAND/ÂΜL (ref 0–0.61)
EOSINOPHIL NFR BLD AUTO: 1 % (ref 0–6)
ERYTHROCYTE [DISTWIDTH] IN BLOOD BY AUTOMATED COUNT: 15.7 % (ref 11.6–15.1)
GFR SERPL CREATININE-BSD FRML MDRD: 89 ML/MIN/1.73SQ M
GLUCOSE P FAST SERPL-MCNC: 87 MG/DL (ref 65–99)
GLUCOSE UR STRIP-MCNC: NEGATIVE MG/DL
HCT VFR BLD AUTO: 51.8 % (ref 36.5–49.3)
HDLC SERPL-MCNC: 33 MG/DL
HGB BLD-MCNC: 16.4 G/DL (ref 12–17)
HGB UR QL STRIP.AUTO: NEGATIVE
IMM GRANULOCYTES # BLD AUTO: 0.01 THOUSAND/UL (ref 0–0.2)
IMM GRANULOCYTES NFR BLD AUTO: 0 % (ref 0–2)
KETONES UR STRIP-MCNC: NEGATIVE MG/DL
LDLC SERPL CALC-MCNC: 127 MG/DL (ref 0–100)
LEUKOCYTE ESTERASE UR QL STRIP: NEGATIVE
LYMPHOCYTES # BLD AUTO: 1.68 THOUSANDS/ÂΜL (ref 0.6–4.47)
LYMPHOCYTES NFR BLD AUTO: 38 % (ref 14–44)
MCH RBC QN AUTO: 27.2 PG (ref 26.8–34.3)
MCHC RBC AUTO-ENTMCNC: 31.7 G/DL (ref 31.4–37.4)
MCV RBC AUTO: 86 FL (ref 82–98)
MONOCYTES # BLD AUTO: 0.38 THOUSAND/ÂΜL (ref 0.17–1.22)
MONOCYTES NFR BLD AUTO: 9 % (ref 4–12)
NEUTROPHILS # BLD AUTO: 2.23 THOUSANDS/ÂΜL (ref 1.85–7.62)
NEUTS SEG NFR BLD AUTO: 51 % (ref 43–75)
NITRITE UR QL STRIP: NEGATIVE
NONHDLC SERPL-MCNC: 174 MG/DL
NRBC BLD AUTO-RTO: 0 /100 WBCS
PH UR STRIP.AUTO: 6 [PH]
PLATELET # BLD AUTO: 184 THOUSANDS/UL (ref 149–390)
PMV BLD AUTO: 12.4 FL (ref 8.9–12.7)
POTASSIUM SERPL-SCNC: 4.1 MMOL/L (ref 3.5–5.3)
PROT SERPL-MCNC: 7.3 G/DL (ref 6.4–8.4)
PROT UR STRIP-MCNC: NEGATIVE MG/DL
PSA SERPL-MCNC: 1.84 NG/ML (ref 0–4)
RBC # BLD AUTO: 6.02 MILLION/UL (ref 3.88–5.62)
SODIUM SERPL-SCNC: 139 MMOL/L (ref 135–147)
SP GR UR STRIP.AUTO: 1.03 (ref 1–1.03)
T3 SERPL-MCNC: 1.3 NG/ML (ref 0.9–1.8)
T4 FREE SERPL-MCNC: 0.69 NG/DL (ref 0.61–1.12)
TRIGL SERPL-MCNC: 233 MG/DL (ref ?–150)
TSH SERPL DL<=0.05 MIU/L-ACNC: 2.06 UIU/ML (ref 0.45–4.5)
UROBILINOGEN UR STRIP-ACNC: <2 MG/DL
WBC # BLD AUTO: 4.41 THOUSAND/UL (ref 4.31–10.16)

## 2025-03-18 PROCEDURE — 80053 COMPREHEN METABOLIC PANEL: CPT

## 2025-03-18 PROCEDURE — 80061 LIPID PANEL: CPT

## 2025-03-18 PROCEDURE — 84153 ASSAY OF PSA TOTAL: CPT

## 2025-03-18 PROCEDURE — 85025 COMPLETE CBC W/AUTO DIFF WBC: CPT

## 2025-03-18 PROCEDURE — 36415 COLL VENOUS BLD VENIPUNCTURE: CPT

## 2025-03-18 PROCEDURE — 81003 URINALYSIS AUTO W/O SCOPE: CPT

## 2025-03-18 PROCEDURE — 84439 ASSAY OF FREE THYROXINE: CPT

## 2025-03-18 PROCEDURE — 84443 ASSAY THYROID STIM HORMONE: CPT

## 2025-03-18 PROCEDURE — 84480 ASSAY TRIIODOTHYRONINE (T3): CPT

## 2025-05-06 PROBLEM — N52.8 OTHER MALE ERECTILE DYSFUNCTION: Status: ACTIVE | Noted: 2025-05-06

## 2025-05-06 NOTE — PROGRESS NOTES
Name: Trey Presley      : 1960      MRN: [id number]  Encounter Provider: GARFIELD Jeong  Encounter Date: 2025   Encounter department: [facility] UROLOGY [place]  :  Assessment & Plan  BPH with obstruction/lower urinary tract symptoms  Has been using 10 mg of alfuzosin every other day but will notice worsening symptoms on the days when he is not using the medication.  Recommend using the medication every day, refills provided.  Discussed diet lifestyle modifications such as limiting bladder irritants and stopping fluid consumption 2 hours prior to bedtime.  I did discuss with him that if his symptoms are not improving after 3 months of staying on the alfuzosin daily he could call the office and make a follow-up appointment to discuss his symptoms further.  If symptoms improve overall would recommend following up in 1 year.  Orders:    POCT Measure PVR    alfuzosin (UROXATRAL) 10 mg 24 hr tablet; Take 1 tablet (10 mg total) by mouth daily    Screening for prostate cancer  PSA continues to remain low and stable, plan to follow-up in 1 year with repeat PSA.  Lab Results   Component Value Date    PSA 1.841 2025    PSA 1.898 2024    PSA 1.93 2024      Orders:    POCT Measure PVR    PSA Total, Diagnostic; Future    Other male erectile dysfunction  Currently managed on Cialis, refills provided.  Orders:    tadalafil (CIALIS) 20 MG tablet; Take 1 tablet (20 mg total) by mouth daily as needed for erectile dysfunction        History of Present Illness   Trey Presley is a 64 y.o. male who presents today to the office for follow-up of BPH with LUTS, erectile dysfunction and prostate cancer screening.  He was last seen in 2024.    Today in the office he states that he is overall doing very well.  He states that he has been taking the alfuzosin every other day.  He does state that the days that he does not take it he notices worsening of his urinary symptoms.  This  "includes urinary urgency/frequency and a weaker urinary stream.  He does report nocturia about 1 time per night.  He states that he does stay well-hydrated with water during the day.  He works third shift and will typically go to bed right when he gets home.  He states that he does drink coffee throughout the day as well which she does notice will sometimes contribute to his urinary symptoms.    He likes to play music in his free time and currently works at [employer] and the [employer].    Review of Systems   Constitutional:  Negative for chills and fever.   Respiratory: Negative.  Negative for cough and shortness of breath.    Cardiovascular: Negative.  Negative for chest pain.   Gastrointestinal: Negative.  Negative for abdominal distention, abdominal pain, nausea and vomiting.   Genitourinary:  Negative for decreased urine volume, difficulty urinating, dysuria, flank pain, frequency, hematuria, penile discharge, penile pain, penile swelling, scrotal swelling, testicular pain and urgency.   Skin: Negative.  Negative for rash.   Neurological: Negative.    Hematological:  Negative for adenopathy. Does not bruise/bleed easily.          Objective   /80 (BP Location: Right arm, Patient Position: Sitting, Cuff Size: Adult)   Pulse 85   Ht 5' 6\" (1.676 m)   Wt 83.2 kg (183 lb 6.4 oz)   SpO2 98%   BMI 29.60 kg/m²     Physical Exam  Vitals reviewed.   Constitutional:       Appearance: Normal appearance.   HENT:      Head: Normocephalic and atraumatic.   Eyes:      Pupils: Pupils are equal, round, and reactive to light.   Cardiovascular:      Rate and Rhythm: Normal rate.   Pulmonary:      Effort: Pulmonary effort is normal.   Abdominal:      General: Abdomen is flat.      Palpations: Abdomen is soft.   Musculoskeletal:      Cervical back: Normal range of motion.   Skin:     General: Skin is warm and dry.   Neurological:      General: No focal deficit present.      Mental Status: He is alert " and oriented to person, place, and time.   Psychiatric:         Mood and Affect: Mood normal.         Behavior: Behavior normal.         Thought Content: Thought content normal.         Judgment: Judgment normal.           Results   Lab Results   Component Value Date    PSA 1.841 03/18/2025    PSA 1.898 09/19/2024    PSA 1.93 01/31/2024     Lab Results   Component Value Date    CALCIUM 9.7 03/18/2025    K 4.1 03/18/2025    CO2 31 03/18/2025     03/18/2025    BUN 12 03/18/2025    CREATININE 0.90 03/18/2025     Lab Results   Component Value Date    WBC 4.41 03/18/2025    HGB 16.4 03/18/2025    HCT 51.8 (H) 03/18/2025    MCV 86 03/18/2025     03/18/2025       Office Urine Dip  Recent Results (from the past hour)   POCT Measure PVR    Collection Time: 05/08/25  7:55 AM   Result Value Ref Range    POST-VOID RESIDUAL VOLUME, ML POC 24 mL

## 2025-05-06 NOTE — ASSESSMENT & PLAN NOTE
Currently managed on Cialis, refills provided.  Orders:    tadalafil (CIALIS) 20 MG tablet; Take 1 tablet (20 mg total) by mouth daily as needed for erectile dysfunction

## 2025-05-06 NOTE — ASSESSMENT & PLAN NOTE
Has been using 10 mg of alfuzosin every other day but will notice worsening symptoms on the days when he is not using the medication.  Recommend using the medication every day, refills provided.  Discussed diet lifestyle modifications such as limiting bladder irritants and stopping fluid consumption 2 hours prior to bedtime.  I did discuss with him that if his symptoms are not improving after 3 months of staying on the alfuzosin daily he could call the office and make a follow-up appointment to discuss his symptoms further.  If symptoms improve overall would recommend following up in 1 year.  Orders:    POCT Measure PVR    alfuzosin (UROXATRAL) 10 mg 24 hr tablet; Take 1 tablet (10 mg total) by mouth daily

## 2025-05-06 NOTE — ASSESSMENT & PLAN NOTE
PSA continues to remain low and stable, plan to follow-up in 1 year with repeat PSA.  Lab Results   Component Value Date    PSA 1.841 03/18/2025    PSA 1.898 09/19/2024    PSA 1.93 01/31/2024      Orders:    POCT Measure PVR    PSA Total, Diagnostic; Future

## 2025-05-08 ENCOUNTER — TELEPHONE (OUTPATIENT)
Age: 65
End: 2025-05-08

## 2025-05-08 ENCOUNTER — OFFICE VISIT (OUTPATIENT)
Dept: UROLOGY | Facility: AMBULATORY SURGERY CENTER | Age: 65
End: 2025-05-08
Payer: COMMERCIAL

## 2025-05-08 VITALS
DIASTOLIC BLOOD PRESSURE: 80 MMHG | HEIGHT: 66 IN | HEART RATE: 85 BPM | SYSTOLIC BLOOD PRESSURE: 148 MMHG | BODY MASS INDEX: 29.47 KG/M2 | WEIGHT: 183.4 LBS | OXYGEN SATURATION: 98 %

## 2025-05-08 DIAGNOSIS — N13.8 BPH WITH OBSTRUCTION/LOWER URINARY TRACT SYMPTOMS: Primary | ICD-10-CM

## 2025-05-08 DIAGNOSIS — N40.1 BENIGN PROSTATIC HYPERPLASIA WITH NOCTURIA: ICD-10-CM

## 2025-05-08 DIAGNOSIS — R35.1 BENIGN PROSTATIC HYPERPLASIA WITH NOCTURIA: ICD-10-CM

## 2025-05-08 DIAGNOSIS — N40.1 BPH WITH OBSTRUCTION/LOWER URINARY TRACT SYMPTOMS: Primary | ICD-10-CM

## 2025-05-08 DIAGNOSIS — Z12.5 SCREENING FOR PROSTATE CANCER: ICD-10-CM

## 2025-05-08 DIAGNOSIS — N52.8 OTHER MALE ERECTILE DYSFUNCTION: ICD-10-CM

## 2025-05-08 LAB — POST-VOID RESIDUAL VOLUME, ML POC: 24 ML

## 2025-05-08 PROCEDURE — 51798 US URINE CAPACITY MEASURE: CPT

## 2025-05-08 PROCEDURE — 99214 OFFICE O/P EST MOD 30 MIN: CPT

## 2025-05-08 RX ORDER — TADALAFIL 20 MG/1
20 TABLET ORAL DAILY PRN
Qty: 10 TABLET | Refills: 3 | Status: SHIPPED | OUTPATIENT
Start: 2025-05-08

## 2025-05-08 RX ORDER — ALFUZOSIN HYDROCHLORIDE 10 MG/1
10 TABLET, EXTENDED RELEASE ORAL DAILY
Qty: 90 TABLET | Refills: 3 | Status: SHIPPED | OUTPATIENT
Start: 2025-05-08

## 2025-05-08 NOTE — TELEPHONE ENCOUNTER
Pt calling to see if he left his wallet at the office after his OV this morning. Advised that  did not find a wallet but will call if it is found. Pt understood and had no further questions or concerns at this time.